# Patient Record
Sex: FEMALE | Race: WHITE | Employment: STUDENT | URBAN - METROPOLITAN AREA
[De-identification: names, ages, dates, MRNs, and addresses within clinical notes are randomized per-mention and may not be internally consistent; named-entity substitution may affect disease eponyms.]

---
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
## 2019-09-09 ENCOUNTER — NURSE TRIAGE (OUTPATIENT)
Dept: OTHER | Facility: CLINIC | Age: 19
End: 2019-09-09

## 2019-09-09 NOTE — TELEPHONE ENCOUNTER
Reason for Disposition   General information question, no triage required and triager able to answer question    Protocols used: INFORMATION ONLY CALL-ADULT-    Pt mother, Fatimah Perla, calling again. Unable to see PCP since it has been 4 years. Informed mom to try an evisit but unsure if she will be able to since she hasn't been seen. Otherwise, could go to a convenience clinic. Also, schedule a well check to re-establish care.

## 2020-02-13 NOTE — PLAN OF CARE
Daily Note    Today's date: 2020  Patient name: Kim Martinez  : 2011  MRN: [id number]  Referring provider: Suman Leggett MD  Dx:   Encounter Diagnosis     ICD-10-CM    1  Sprain of anterior talofibular ligament of left ankle, subsequent encounter S93 492D      Subjective: Diana Bernabe presents for PT today with her Mom  Diana Bernabe initially denied pain or concerns  She later pointed to her right plantar aspect of her foot/longitudinal arch and left inframalleolar generalized area as sources of pain with prolonged activity  No swelling or redness observed  Mom and Diana Bernabe deny any self restricted activities or decrease in running/jumping activities  Objective: See treatment diary below      Assessment: Tolerated treatment well  Despite Jeane's report of pain with palpation to various aspects of the plantar aspect of her feet and ankle structures this report is inconsistent with her activity tolerance and FLACC during high level balance activities  This is likely due to ongoing hypersensitivity as well as patients eagerness to continue with PT  Plan to discuss incorporating sensory/desensitization for the feet into Jeane's OT sessions  Regarding treatment for her ankle sprain patient continues with no swelling and improved stability to her baseline  Mom is reporting decreased incidents of rolling her ankles since starting with high top shoes  Plan: Continue per plan of care  1 additional visits prior to d/c  Precautions: standard  Objective      Goals  STGs (4 weeks)  1) Diana Carvalhor will demonstrate improved SLB bilaterally with eyes closed, exhibiting age appropriate ankle strategy for >6 seconds on 3/5 trials  Progress: 6 seconds bilaterally, EC   2) Diana Carvalhor will achieve >10 deg ankle DF PROM bilaterally (with knees extended) in order to demonstrate appropriate ankle DF PROM for ambulation  Progress: 10deg R, 5 deg Left     3) Diana Carvalhor will tolerate >30 minutes of dynamic activity without c/o left ankle impaired, limited or restricted    Pain Scale: 6/10  Easing factors: tucking arm in front of her  Provocative factors: arm out to side or in a 90-90 position has ant pain, R SL    Type: []Constant   [x]Intermittent  []Radiating []Localized []other:     Numbness/Tingling: none    Occupation/School: [employer]    Living Status/Prior Level of Function: Independent with ADLs and IADLs, bowl, soft ball    OBJECTIVE:     CERV ROM     Cervical Flexion     Cervical Extension     Cervical SB     Cervical rotation          ROM Left Right   Shoulder Flex    P/A 160/ 150/   Shoulder Abd     Shoulder ER      Pass/act  106/   Shoulder IR  Pass/act 73/ 37/                  Strength  Left Right   Shoulder Flex  5/5   Shoulder Scap  5/5   Shoulder ER  5/5   Shoulder IR  5/5               Reflexes/Sensation:    [x]Dermatomes/Myotomes intact    [x]Reflexes equal and normal bilaterally   []Other:    Joint mobility:    []Normal    []Hypo   []Hyper    Palpation: not tender    Functional Mobility/Transfers: independent     Posture: rounded shldrs    Bandages/Dressings/Incisions: nA    Gait: (include devices/WB status): WNL    Orthopedic Special Tests:                        [x] Patient history, allergies, meds reviewed. Medical chart reviewed. See intake form. Review Of Systems (ROS):  [x]Performed Review of systems (Integumentary, CardioPulmonary, Neurological) by intake and observation. Intake form has been scanned into medical record. Patient has been instructed to contact their primary care physician regarding ROS issues if not already being addressed at this time.       Co-morbidities/Complexities (which will affect course of rehabilitation):   [x]None           Arthritic conditions   []Rheumatoid arthritis (M05.9)  []Osteoarthritis (M19.91)   Cardiovascular conditions   []Hypertension (I10)  []Hyperlipidemia (E78.5)  []Angina pectoris (I20)  []Atherosclerosis (I70)   Musculoskeletal conditions   []Disc pathology   []Congenital pain or instability for 3 consecutive sessions  Progress: met previous session however pt with reports of generalized pain inconsistently this visit  LTGs (8 weeks)  1) Carey Barnes will tolerate dynamic activity (running >100 feet, DL jump 10 consecutive times, SL jump 5 consecutive times) without c/o pain or instability in order to demonstrate safe particpation in age appropriate activity  Progress: Pt demonstrating double leg jumping >10 consecutive times without instability  Treatment: [id number]  Manual  2/13/20   Bilateral ankle DF Prone: ankle DF, talocrural distraction, posterior glides, metatarsal sweeps: Bilateral LEs  Exercise Diary     Warm up Treadmill: 2 0mph, 0%-8% grade/incline: x10min  Left ankle isometric/reactions      balance Barefoot: step up/over folded up mat with dynamic change ("seesaw") x1, SLS on foam: x10sec on the L, x18 seconds on the R     1st toe extension    Towel scrunch    [place] pickup    Left ankle TB/EV    Self gastroc stretch    Other    Jumping:  Noted bilateral DL jump consecutively x5 and SL jump consecutively x5 without c/o pain and without incidence of ankle instability  spine pathologies   []Prior surgical intervention  []Osteoporosis (M81.8)  []Osteopenia (M85.8)   Endocrine conditions   []Hypothyroid (E03.9)  []Hyperthyroid Gastrointestinal conditions   []Constipation (B53.55)   Metabolic conditions   []Morbid obesity (E66.01)  []Diabetes type 1(E10.65) or 2 (E11.65)   []Neuropathy (G60.9)     Pulmonary conditions   []Asthma (J45)  []Coughing   []COPD (J44.9)   Psychological Disorders  []Anxiety (F41.9)  []Depression (F32.9)   []Other:   []Other:          Barriers to/and or personal factors that will affect rehab potential:              []Age  []Sex              []Motivation/Lack of Motivation                        []Co-Morbidities              []Cognitive Function, education/learning barriers              []Environmental, home barriers              []profession/work barriers  []past PT/medical experience  []other:  Justification:      Falls Risk Assessment (30 days): 0  [x] Falls Risk assessed and no intervention required. [] Falls Risk assessed and Patient requires intervention due to being higher risk   TUG score (>12s at risk):     [] Falls education provided, including       G-Codes:  PT G-Codes  Functional Assessment Tool Used: quick  Score: 45%  Functional Limitation: Carrying, moving and handling objects  Carrying, Moving and Handling Objects Current Status (): At least 40 percent but less than 60 percent impaired, limited or restricted  Carrying, Moving and Handling Objects Goal Status ():  At least 1 percent but less than 20 percent impaired, limited or restricted    ASSESSMENT:   Functional Impairments   []Noted spinal or UE joint hypomobility   []Noted spinal or UE joint hypermobility   [x]Decreased UE functional ROM   [x]Decreased UE functional strength   []Abnormal reflexes/sensation/myotomal/dermatomal deficits   [x]Decreased RC/scapular/core strength and neuromuscular control   []other:      Functional Activity Limitations (from functional questionnaire facilitate improvement in movement, function, and ADLs as indicated by Functional Deficits. Long Term Goals: To be achieved in: 8 weeks  1. Disability index score of 19% or less for the DASH to assist with reaching prior level of function. 2. Patient will demonstrate increased AROM to 150 to allow for proper joint functioning as indicated by patients Functional Deficits. 3. Patient will demonstrate an increase in Strength to 5/5 to allow for proper functional mobility as indicated by patients Functional Deficits. 4. Patient will return to full functional activities without increased symptoms or restriction.         Electronically signed by:  Laura aY PT

## 2020-10-05 ENCOUNTER — APPOINTMENT (OUTPATIENT)
Dept: ULTRASOUND IMAGING | Age: 20
DRG: 561 | End: 2020-10-05
Payer: COMMERCIAL

## 2020-10-05 ENCOUNTER — HOSPITAL ENCOUNTER (INPATIENT)
Age: 20
LOS: 2 days | Discharge: HOME | DRG: 561 | End: 2020-10-07
Attending: EMERGENCY MEDICINE | Admitting: INTERNAL MEDICINE
Payer: COMMERCIAL

## 2020-10-05 PROBLEM — K85.10 GALLSTONE PANCREATITIS: Status: ACTIVE | Noted: 2020-10-05

## 2020-10-05 LAB
ALBUMIN SERPL-MCNC: 4.7 G/DL (ref 3.4–5)
ALP BLD-CCNC: 119 U/L (ref 40–129)
ALT SERPL-CCNC: 487 U/L (ref 10–40)
ANION GAP SERPL CALCULATED.3IONS-SCNC: 11 MMOL/L (ref 3–16)
AST SERPL-CCNC: 852 U/L (ref 15–37)
BACTERIA: ABNORMAL /HPF
BASOPHILS ABSOLUTE: 0 K/UL (ref 0–0.2)
BASOPHILS RELATIVE PERCENT: 0.6 %
BILIRUB SERPL-MCNC: 1.5 MG/DL (ref 0–1)
BILIRUBIN DIRECT: 1 MG/DL (ref 0–0.3)
BILIRUBIN URINE: NEGATIVE
BILIRUBIN, INDIRECT: 0.5 MG/DL (ref 0–1)
BLOOD, URINE: ABNORMAL
BUN BLDV-MCNC: 10 MG/DL (ref 7–20)
CALCIUM SERPL-MCNC: 9.8 MG/DL (ref 8.3–10.6)
CHLORIDE BLD-SCNC: 104 MMOL/L (ref 99–110)
CLARITY: ABNORMAL
CO2: 24 MMOL/L (ref 21–32)
COLOR: YELLOW
CREAT SERPL-MCNC: 0.7 MG/DL (ref 0.6–1.1)
EOSINOPHILS ABSOLUTE: 0.1 K/UL (ref 0–0.6)
EOSINOPHILS RELATIVE PERCENT: 0.7 %
EPITHELIAL CELLS, UA: ABNORMAL /HPF (ref 0–5)
GFR AFRICAN AMERICAN: >60
GFR NON-AFRICAN AMERICAN: >60
GLUCOSE BLD-MCNC: 112 MG/DL (ref 70–99)
GLUCOSE URINE: NEGATIVE MG/DL
HCG QUALITATIVE: NEGATIVE
HCT VFR BLD CALC: 37.8 % (ref 36–48)
HEMOGLOBIN: 13 G/DL (ref 12–16)
KETONES, URINE: NEGATIVE MG/DL
LEUKOCYTE ESTERASE, URINE: ABNORMAL
LIPASE: 570 U/L (ref 13–60)
LYMPHOCYTES ABSOLUTE: 1.6 K/UL (ref 1–5.1)
LYMPHOCYTES RELATIVE PERCENT: 21 %
MCH RBC QN AUTO: 30.7 PG (ref 26–34)
MCHC RBC AUTO-ENTMCNC: 34.3 G/DL (ref 31–36)
MCV RBC AUTO: 89.6 FL (ref 80–100)
MICROSCOPIC EXAMINATION: YES
MONOCYTES ABSOLUTE: 0.8 K/UL (ref 0–1.3)
MONOCYTES RELATIVE PERCENT: 10.8 %
NEUTROPHILS ABSOLUTE: 5.2 K/UL (ref 1.7–7.7)
NEUTROPHILS RELATIVE PERCENT: 66.9 %
NITRITE, URINE: NEGATIVE
PDW BLD-RTO: 13.6 % (ref 12.4–15.4)
PH UA: 8 (ref 5–8)
PLATELET # BLD: 385 K/UL (ref 135–450)
PMV BLD AUTO: 7.5 FL (ref 5–10.5)
POTASSIUM REFLEX MAGNESIUM: 4.1 MMOL/L (ref 3.5–5.1)
PROTEIN UA: NEGATIVE MG/DL
RBC # BLD: 4.22 M/UL (ref 4–5.2)
RBC UA: ABNORMAL /HPF (ref 0–4)
SODIUM BLD-SCNC: 139 MMOL/L (ref 136–145)
SPECIFIC GRAVITY UA: 1.01 (ref 1–1.03)
TOTAL PROTEIN: 8.2 G/DL (ref 6.4–8.2)
URINE REFLEX TO CULTURE: YES
URINE TYPE: ABNORMAL
UROBILINOGEN, URINE: 0.2 E.U./DL
WBC # BLD: 7.7 K/UL (ref 4–11)
WBC UA: ABNORMAL /HPF (ref 0–5)

## 2020-10-05 PROCEDURE — 85025 COMPLETE CBC W/AUTO DIFF WBC: CPT

## 2020-10-05 PROCEDURE — 76705 ECHO EXAM OF ABDOMEN: CPT

## 2020-10-05 PROCEDURE — 84703 CHORIONIC GONADOTROPIN ASSAY: CPT

## 2020-10-05 PROCEDURE — G0378 HOSPITAL OBSERVATION PER HR: HCPCS

## 2020-10-05 PROCEDURE — 96372 THER/PROPH/DIAG INJ SC/IM: CPT

## 2020-10-05 PROCEDURE — 87077 CULTURE AEROBIC IDENTIFY: CPT

## 2020-10-05 PROCEDURE — 80074 ACUTE HEPATITIS PANEL: CPT

## 2020-10-05 PROCEDURE — 87086 URINE CULTURE/COLONY COUNT: CPT

## 2020-10-05 PROCEDURE — 80076 HEPATIC FUNCTION PANEL: CPT

## 2020-10-05 PROCEDURE — 6370000000 HC RX 637 (ALT 250 FOR IP): Performed by: INTERNAL MEDICINE

## 2020-10-05 PROCEDURE — 2580000003 HC RX 258: Performed by: EMERGENCY MEDICINE

## 2020-10-05 PROCEDURE — 1200000000 HC SEMI PRIVATE

## 2020-10-05 PROCEDURE — 99285 EMERGENCY DEPT VISIT HI MDM: CPT

## 2020-10-05 PROCEDURE — 36415 COLL VENOUS BLD VENIPUNCTURE: CPT

## 2020-10-05 PROCEDURE — 6360000002 HC RX W HCPCS: Performed by: EMERGENCY MEDICINE

## 2020-10-05 PROCEDURE — 96367 TX/PROPH/DG ADDL SEQ IV INF: CPT

## 2020-10-05 PROCEDURE — 96361 HYDRATE IV INFUSION ADD-ON: CPT

## 2020-10-05 PROCEDURE — 83690 ASSAY OF LIPASE: CPT

## 2020-10-05 PROCEDURE — 6360000002 HC RX W HCPCS: Performed by: INTERNAL MEDICINE

## 2020-10-05 PROCEDURE — 2580000003 HC RX 258: Performed by: INTERNAL MEDICINE

## 2020-10-05 PROCEDURE — 6370000000 HC RX 637 (ALT 250 FOR IP): Performed by: NURSE PRACTITIONER

## 2020-10-05 PROCEDURE — 96365 THER/PROPH/DIAG IV INF INIT: CPT

## 2020-10-05 PROCEDURE — 99254 IP/OBS CNSLTJ NEW/EST MOD 60: CPT | Performed by: INTERNAL MEDICINE

## 2020-10-05 PROCEDURE — 80048 BASIC METABOLIC PNL TOTAL CA: CPT

## 2020-10-05 PROCEDURE — 81001 URINALYSIS AUTO W/SCOPE: CPT

## 2020-10-05 RX ORDER — POLYETHYLENE GLYCOL 3350 17 G/17G
17 POWDER, FOR SOLUTION ORAL DAILY PRN
Status: DISCONTINUED | OUTPATIENT
Start: 2020-10-05 | End: 2020-10-07 | Stop reason: HOSPADM

## 2020-10-05 RX ORDER — ONDANSETRON 2 MG/ML
4 INJECTION INTRAMUSCULAR; INTRAVENOUS EVERY 6 HOURS PRN
Status: DISCONTINUED | OUTPATIENT
Start: 2020-10-05 | End: 2020-10-07 | Stop reason: HOSPADM

## 2020-10-05 RX ORDER — PRENATAL WITH FERROUS FUM AND FOLIC ACID 3080; 920; 120; 400; 22; 1.84; 3; 20; 10; 1; 12; 200; 27; 25; 2 [IU]/1; [IU]/1; MG/1; [IU]/1; MG/1; MG/1; MG/1; MG/1; MG/1; MG/1; UG/1; MG/1; MG/1; MG/1; MG/1
1 TABLET ORAL DAILY
Status: DISCONTINUED | OUTPATIENT
Start: 2020-10-05 | End: 2020-10-07 | Stop reason: HOSPADM

## 2020-10-05 RX ORDER — SODIUM CHLORIDE 9 MG/ML
INJECTION, SOLUTION INTRAVENOUS CONTINUOUS
Status: DISCONTINUED | OUTPATIENT
Start: 2020-10-05 | End: 2020-10-06

## 2020-10-05 RX ORDER — SODIUM CHLORIDE 0.9 % (FLUSH) 0.9 %
10 SYRINGE (ML) INJECTION EVERY 12 HOURS SCHEDULED
Status: DISCONTINUED | OUTPATIENT
Start: 2020-10-05 | End: 2020-10-07 | Stop reason: HOSPADM

## 2020-10-05 RX ORDER — 0.9 % SODIUM CHLORIDE 0.9 %
1000 INTRAVENOUS SOLUTION INTRAVENOUS ONCE
Status: COMPLETED | OUTPATIENT
Start: 2020-10-05 | End: 2020-10-05

## 2020-10-05 RX ORDER — IBUPROFEN 400 MG/1
400 TABLET ORAL EVERY 6 HOURS PRN
Status: DISCONTINUED | OUTPATIENT
Start: 2020-10-05 | End: 2020-10-07 | Stop reason: HOSPADM

## 2020-10-05 RX ORDER — CHOLECALCIFEROL (VITAMIN D3) 125 MCG
5 CAPSULE ORAL NIGHTLY PRN
Status: DISCONTINUED | OUTPATIENT
Start: 2020-10-05 | End: 2020-10-07 | Stop reason: HOSPADM

## 2020-10-05 RX ORDER — SODIUM CHLORIDE 0.9 % (FLUSH) 0.9 %
10 SYRINGE (ML) INJECTION PRN
Status: DISCONTINUED | OUTPATIENT
Start: 2020-10-05 | End: 2020-10-07 | Stop reason: HOSPADM

## 2020-10-05 RX ADMIN — SODIUM CHLORIDE 1000 ML: 9 INJECTION, SOLUTION INTRAVENOUS at 12:00

## 2020-10-05 RX ADMIN — PIPERACILLIN AND TAZOBACTAM 3.38 G: 3; .375 INJECTION, POWDER, LYOPHILIZED, FOR SOLUTION INTRAVENOUS at 11:59

## 2020-10-05 RX ADMIN — MELATONIN TAB 5 MG 5 MG: 5 TAB at 22:07

## 2020-10-05 RX ADMIN — IBUPROFEN 400 MG: 400 TABLET, FILM COATED ORAL at 15:13

## 2020-10-05 RX ADMIN — Medication 10 ML: at 18:07

## 2020-10-05 RX ADMIN — SODIUM CHLORIDE: 9 INJECTION, SOLUTION INTRAVENOUS at 18:00

## 2020-10-05 RX ADMIN — ENOXAPARIN SODIUM 40 MG: 40 INJECTION SUBCUTANEOUS at 18:13

## 2020-10-05 RX ADMIN — SODIUM CHLORIDE 1.5 G: 900 INJECTION INTRAVENOUS at 18:02

## 2020-10-05 ASSESSMENT — PAIN SCALES - GENERAL
PAINLEVEL_OUTOF10: 4
PAINLEVEL_OUTOF10: 0

## 2020-10-05 NOTE — ED NOTES
Pt requesting something for pain request Ibuprofen. Dr Star Bustamante made aware.      Carol Ross, GLORIAN  02/48/17 4765

## 2020-10-05 NOTE — ED NOTES
Called [facility] @ [phone]  Re: LFT/lipase bumped, gallstones  Dr Anjum Zhao responded @ [facility]  10/05/20 [phone]

## 2020-10-05 NOTE — ED NOTES
Pt states \"last meal was yesterday last drink sips of water today\".       Derek Cruz, GLORIAN  03/79/94 [place]. Tiago Pires, JOANA  75/38/45 0231

## 2020-10-05 NOTE — ED PROVIDER NOTES
[facility] Emergency Department    CHIEF COMPLAINT  Chief Complaint   Patient presents with    Abdominal Pain     patient c/o abdominal pain that started around 0230 and ended around 0700. pt reporting \"i have gallbladder issues\"         HISTORY OF PRESENT ILLNESS  Adriana Jacob is a 20 y.o. female  who presents to the ED complaining of intermittent RUQ abdominal pain. She is roughly a month postpartum and currently breastfeeding. RUQ pain when it occurs is typically at night, not specifically postprandial though, and not associated with fever. Last night/this morning was the worst though at time of ED presentation she is wholly asymptomatic, no nausea or pain at all. No jaundice at any point. No back pains or chest pains. The RUQ pain sometimes radiates to the back though and is crampy when it occurs. During her pregnancy she was told she has GB sludge and did not get surgery at that time due to the pregnancy. She has had 3 of these pain episodes this week which are making it hard for her to take care of her baby when it happens. No fevers. No pale or loose or black/bloody stools, no dysuria or hematuria or other urinary complaints. No other complaints, modifying factors or associated symptoms. I have reviewed the following from the nursing documentation.     Past Medical History:   Diagnosis Date    Asthma     Stress reaction, metatarsal      Past Surgical History:   Procedure Laterality Date    TYMPANOSTOMY TUBE PLACEMENT  2000     Family History   Problem Relation Age of Onset    High Blood Pressure Mother     Cancer Maternal Grandmother 30        Ovarian cancer     Social History     Socioeconomic History    Marital status: Single     Spouse name: Not on file    Number of children: Not on file    Years of education: Not on file    Highest education level: Not on file   Occupational History    Not on file   Social Needs    Financial resource strain: Not on file    Food insecurity     Worry: Not on file     Inability: Not on file    Transportation needs     Medical: Not on file     Non-medical: Not on file   Tobacco Use    Smoking status: Never Smoker    Smokeless tobacco: Never Used   Substance and Sexual Activity    Alcohol use: No    Drug use: Not on file    Sexual activity: Not on file   Lifestyle    Physical activity     Days per week: Not on file     Minutes per session: Not on file    Stress: Not on file   Relationships    Social connections     Talks on phone: Not on file     Gets together: Not on file     Attends Zoroastrian service: Not on file     Active member of club or organization: Not on file     Attends meetings of clubs or organizations: Not on file     Relationship status: Not on file    Intimate partner violence     Fear of current or ex partner: Not on file     Emotionally abused: Not on file     Physically abused: Not on file     Forced sexual activity: Not on file   Other Topics Concern    Not on file   Social History Narrative    Not on file     No current facility-administered medications for this encounter. Current Outpatient Medications   Medication Sig Dispense Refill    Prenatal Vit-Fe Fumarate-FA (PRENATAL 1+1 PO) Take by mouth       Allergies   Allergen Reactions    Sulfa Antibiotics Hives       REVIEW OF SYSTEMS  10 systems reviewed, pertinent positives per HPI otherwise noted to be negative. PHYSICAL EXAM  BP (!) 131/91   Pulse 84   Temp 98.7 °F (37.1 °C) (Oral)   Resp 17   Ht 5' 10\" (1.778 m)   Wt 200 lb (90.7 kg)   SpO2 99%   BMI 28.70 kg/m²    GENERAL APPEARANCE: Awake and alert. Cooperative. No distress. HENT: Normocephalic. Atraumatic. Mucous membranes are moist.  NECK: Supple. EYES: PERRL. EOM's grossly intact. HEART/CHEST: RRR. No murmurs. No chest wall tenderness. LUNGS: Respirations unlabored. CTAB. Good air exchange. Speaking comfortably in full sentences. ABDOMEN: No tenderness. Soft. Non-distended. No masses. No organomegaly. No guarding or rebound. Normal bowel sounds throughout. Neg Brown's sign. MUSCULOSKELETAL: No extremity edema. Compartments soft. No deformity. No tenderness in the extremities. All extremities neurovascularly intact. SKIN: Warm and dry. No acute rashes. NEUROLOGICAL: Alert and oriented. CN's 2-12 intact. No gross facial drooping. Strength 5/5, sensation intact. 2 plus DTR's in knees bilaterally. Gait normal.  PSYCHIATRIC: Normal mood and affect. LABS  I have reviewed all labs for this visit.    Results for orders placed or performed during the hospital encounter of 10/05/20   Urinalysis Reflex to Culture    Specimen: Urine, clean catch   Result Value Ref Range    Color, UA Yellow Straw/Yellow    Clarity, UA SL CLOUDY (A) Clear    Glucose, Ur Negative Negative mg/dL    Bilirubin Urine Negative Negative    Ketones, Urine Negative Negative mg/dL    Specific Gravity, UA 1.015 1.005 - 1.030    Blood, Urine MODERATE (A) Negative    pH, UA 8.0 5.0 - 8.0    Protein, UA Negative Negative mg/dL    Urobilinogen, Urine 0.2 <2.0 E.U./dL    Nitrite, Urine Negative Negative    Leukocyte Esterase, Urine MODERATE (A) Negative    Microscopic Examination YES     Urine Type NotGiven     Urine Reflex to Culture Yes    CBC Auto Differential   Result Value Ref Range    WBC 7.7 4.0 - 11.0 K/uL    RBC 4.22 4.00 - 5.20 M/uL    Hemoglobin 13.0 12.0 - 16.0 g/dL    Hematocrit 37.8 36.0 - 48.0 %    MCV 89.6 80.0 - 100.0 fL    MCH 30.7 26.0 - 34.0 pg    MCHC 34.3 31.0 - 36.0 g/dL    RDW 13.6 12.4 - 15.4 %    Platelets 655 994 - 216 K/uL    MPV 7.5 5.0 - 10.5 fL    Neutrophils % 66.9 %    Lymphocytes % 21.0 %    Monocytes % 10.8 %    Eosinophils % 0.7 %    Basophils % 0.6 %    Neutrophils Absolute 5.2 1.7 - 7.7 K/uL    Lymphocytes Absolute 1.6 1.0 - 5.1 K/uL    Monocytes Absolute 0.8 0.0 - 1.3 K/uL    Eosinophils Absolute 0.1 0.0 - 0.6 K/uL    Basophils Absolute 0.0 0.0 - 0.2 K/uL   Basic Metabolic Panel w/ Reflex to MG   Result Value Ref Range    Sodium 139 136 - 145 mmol/L    Potassium reflex Magnesium 4.1 3.5 - 5.1 mmol/L    Chloride 104 99 - 110 mmol/L    CO2 24 21 - 32 mmol/L    Anion Gap 11 3 - 16    Glucose 112 (H) 70 - 99 mg/dL    BUN 10 7 - 20 mg/dL    CREATININE 0.7 0.6 - 1.1 mg/dL    GFR Non-African American >60 >60    GFR African American >60 >60    Calcium 9.8 8.3 - 10.6 mg/dL   Hepatic function panel   Result Value Ref Range    Total Protein 8.2 6.4 - 8.2 g/dL    Alb 4.7 3.4 - 5.0 g/dL    Alkaline Phosphatase 119 40 - 129 U/L     (H) 10 - 40 U/L     (H) 15 - 37 U/L    Total Bilirubin 1.5 (H) 0.0 - 1.0 mg/dL    Bilirubin, Direct 1.0 (H) 0.0 - 0.3 mg/dL    Bilirubin, Indirect 0.5 0.0 - 1.0 mg/dL   Lipase   Result Value Ref Range    Lipase 570.0 (H) 13.0 - 60.0 U/L   HCG Qualitative, Serum   Result Value Ref Range    hCG Qual Negative Detects HCG level >10 MIU/mL   Microscopic Urinalysis   Result Value Ref Range    WBC, UA 10-20 (A) 0 - 5 /HPF    RBC, UA 21-50 (A) 0 - 4 /HPF    Epithelial Cells, UA 6-10 (A) 0 - 5 /HPF    Bacteria, UA 1+ (A) None Seen /HPF       RADIOLOGY    Us Gallbladder Ruq    Result Date: 10/5/2020  EXAMINATION: RIGHT UPPER QUADRANT ULTRASOUND 10/5/2020 9:44 am COMPARISON: None. HISTORY: ORDERING SYSTEM PROVIDED HISTORY: biliary colic sx TECHNOLOGIST PROVIDED HISTORY: Reason for exam:->biliary colic sx FINDINGS: LIVER:  The liver demonstrates normal echogenicity without evidence of intrahepatic biliary ductal dilatation. BILIARY SYSTEM:  The gallbladder contains multiple shadowing echogenic gallstones. There is mild wall thickening of 6 mm. Common bile duct is at the upper limits of normal, measuring 7 mm. RIGHT KIDNEY: The right kidney is grossly unremarkable without evidence of hydronephrosis. PANCREAS:  Visualized portions of the pancreas are unremarkable. OTHER: No evidence of right upper quadrant ascites.      1. Cholelithiasis with sonographic evidence of cholecystitis. 2. Borderline common duct dilatation. Laboratory correlation requested. MRCP and/or ERCP would be options to further evaluate in the appropriate clinical setting. ED COURSE/MDM  Patient seen and evaluated. Old records reviewed. Labs and imaging reviewed and results discussed with patient. After initial evaluation, differential diagnostic considerations included: kidney stone, pyelonephritis, UTI, appendicitis, bowel obstruction, diverticulitis, hernia, gastritis/gastroenteritis, pancreatitis, cholecystitis, hepatitis, constipation, IBS, IBD    The patient's ED workup was notable for concern for biliary colic with known gallstones. Found to have lipase elevation, bilirubin elevation, transaminitis despite now being pain-free and overall feeling better. Kept NPO in ED. Given notable lab abnormalities, concern for choledocholithiasis though stone may have passed. RUQ u/s shows cholecystitis findings as well. Dr. Vic Masters from surgery was consulted about the patient's ED history, physical, workup, and course so far. Recommendations from this consultant included follow labs tomorrow, serial abd exam, to determine timing of surgery (inpt vs outpt). OK with Zosyn for now. Requests hospitalist admit and GI consult. GI Dr. Kenyatta Harman was consulted about the patient's ED history, physical, workup, and course so far. Recommendations from this consultant included agreement with plan as above for now, they will follow as well. During the patient's ED course, the patient was given:  Medications   piperacillin-tazobactam (ZOSYN) 3.375 g in dextrose 5 % 50 mL IVPB (mini-bag) (3.375 g Intravenous New Bag 10/5/20 1159)   0.9 % sodium chloride IV bolus 1,000 mL (1,000 mLs Intravenous New Bag 10/5/20 1200)        CLINICAL IMPRESSION  1. Acute cholecystitis due to biliary calculus    2. Acute gallstone pancreatitis    3. Biliary obstruction    4. Transaminitis    5.  Postpartum

## 2020-10-05 NOTE — H&P
Hospital Medicine History & Physical      PCP: Julio Falcon DO    Date of Admission: 10/5/2020    Date of Service: Pt seen/examined on 10/05/20 and Admitted to Inpatient with expected LOS greater than two midnights due to medical therapy. Chief Complaint:  RUQ pain, n/v    History Of Present Illness: The patient is a pleasant 20 Y F with minimal PMH other than giving birth to a healthy baby boy about a month ago. She is breastfeeding. During her pregnancy she was treated conservatively for biliary colic, with sludge on her GB ultrasound. For the last week she has had intermittent spells of severe RUQ pain, radiating to her R scapular area, associated with nausea and vomiting. Another such spell occurred around 2:30 this AM and lasted until 7 AM, so she came to the ED to be evaluated. US showed calculous cholecystitis with a dilated CBD, and labs showed hepatitis and pancreatitis. She was not febrile and did not have a leukocytosis. GI, general surgery, and hospital medicine were consulted from the ED. The patient actually feels OK now. The pain has been minimal since early this AM.  She doesn't even have any RUQ tenderness on exam.        Past Medical History:          Diagnosis Date    Asthma     Stress reaction, metatarsal        Past Surgical History:          Procedure Laterality Date    TYMPANOSTOMY TUBE PLACEMENT  2000       Medications Prior to Admission:      Prior to Admission medications    Medication Sig Start Date End Date Taking? Authorizing Provider   Prenatal Vit-Fe Fumarate-FA (PRENATAL 1+1 PO) Take by mouth   Yes Historical Provider, MD       Allergies:  Sulfa antibiotics    Social History:      The patient currently lives at home    TOBACCO:   reports that she has never smoked. She has never used smokeless tobacco.  ETOH:   reports no history of alcohol use.   E-Cigarettes Vaping or Juuling     Questions Responses    Vaping Use     Start Date     Does device contain nicotine? Quit Date     Vaping Type             Family History:      Reviewed in detail and negative for ESRD. Positive as follows:        Problem Relation Age of Onset    High Blood Pressure Mother     Cancer Maternal Grandmother 30        Ovarian cancer       REVIEW OF SYSTEMS:   Pertinent positives as noted in the HPI. All other systems reviewed and negative. PHYSICAL EXAM PERFORMED:    BP (!) 131/91   Pulse 84   Temp 98.7 °F (37.1 °C) (Oral)   Resp 17   Ht 5' 10\" (1.778 m)   Wt 200 lb (90.7 kg)   SpO2 99%   BMI 28.70 kg/m²     General appearance:  No apparent distress, appears stated age and cooperative. HEENT:  Normal cephalic, atraumatic without obvious deformity. Pupils equal, round, and reactive to light. Extra ocular muscles intact. Conjunctivae/corneas clear. Neck: Supple, with full range of motion. No jugular venous distention. Trachea midline. Respiratory:  Normal respiratory effort. Clear to auscultation, bilaterally without Rales/Wheezes/Rhonchi. Cardiovascular:  Regular rate and rhythm with normal S1/S2 without murmurs, rubs or gallops. Abdomen: Soft, non-tender, non-distended with normal bowel sounds. No R flank tenderness either. Musculoskeletal:  No clubbing, cyanosis or edema bilaterally. Full range of motion without deformity. Skin: Skin color, texture, turgor normal.  No rashes or lesions. Neurologic:  Neurovascularly intact without any focal sensory/motor deficits. Cranial nerves: II-XII intact, grossly non-focal.  Psychiatric:  Alert and oriented, thought content appropriate, normal insight. Not particularly anxious.    Capillary Refill: Brisk,< 3 seconds   Peripheral Pulses: +2 palpable, equal bilaterally       Labs:     Recent Labs     10/05/20  0939   WBC 7.7   HGB 13.0   HCT 37.8        Recent Labs     10/05/20  0939      K 4.1      CO2 24   BUN 10   CREATININE 0.7   CALCIUM 9.8     Recent Labs     10/05/20  0939   *   *   BILIDIR 1.0*   BILITOT 1.5*   ALKPHOS 119     No results for input(s): INR in the last 72 hours. No results for input(s): Cesar Hug in the last 72 hours. Urinalysis:      Lab Results   Component Value Date    NITRU Negative 10/05/2020    WBCUA 10-20 10/05/2020    BACTERIA 1+ 10/05/2020    RBCUA 21-50 10/05/2020    BLOODU MODERATE 10/05/2020    SPECGRAV 1.015 10/05/2020    GLUCOSEU Negative 10/05/2020       Radiology:     CXR: I have reviewed the CXR with the following interpretation: none  EKG:  I have reviewed the EKG with the following interpretation: none    US GALLBLADDER RUQ   Final Result   1. Cholelithiasis with sonographic evidence of cholecystitis. 2. Borderline common duct dilatation. Laboratory correlation requested. MRCP and/or ERCP would be options to further evaluate in the appropriate   clinical setting. ASSESSMENT:    Active Hospital Problems    Diagnosis Date Noted    Gallstone pancreatitis [K85.10] 10/05/2020         PLAN:    The patient is a pleasant 20 Y F with minimal PMH other than giving birth to a healthy baby boy about a month ago. She is breastfeeding. During her pregnancy she was treated conservatively for biliary colic, with sludge on her GB ultrasound. For the last week she has had intermittent spells of severe RUQ pain, radiating to her R scapular area, associated with nausea and vomiting. Another such spell occurred around 2:30 this AM and lasted until 7 AM, so she came to the ED to be evaluated. US showed calculous cholecystitis with a dilated CBD, and labs showed hepatitis and pancreatitis. She was not febrile and did not have a leukocytosis. GI, general surgery, and hospital medicine were consulted from the ED.       Calculous cholecystitis, complicated by suspected choledocholithiasis, hepatitis, and gallstone pancreatitis  - If surgery is not planning for cholecystectomy with IOC during this admission, then will defer to GI as to whether MRCP would be helpful now prior to eventual cholecystectomy. - of note, her LFT elevation is not clearly in a cholestatic pattern. Perhaps she already passed the stone. Trend labs. - zosyn in the ED, continued with unasyn on admission, will plan for augmentin upon discharge. Avoid both cipro and metronidazole since she is breastfeeding.  - IVFs, antiemetics  - analgesia. Avoiding acetaminophen due to the LFTs. Ibuprofen would be OK. Avoiding opioids if possible since she is breastfeeding - discussed risks in detail with patient and her mother, and they understand that if the patient is really miserable then the risk may be worth it, they agreed. - this patient would be low risk for cholecystectomy, there are no contraindications, and no further preoperative testing would be necessary. Possible acute cystitis  - abx as above, f/u urine culture    Asthma  - she apparently isn't on any medications. Monitor for wheezing. DVT Prophylaxis: enoxaparin  Diet: Diet NPO Effective Now Exceptions are: Ice Chips  Code Status: No Order    PT/OT Eval Status: not indicated    Dispo - perhaps 10/6-7, pending tolerating of diet and GI and surgery input. She lives at home. Annamarie Mcleod MD    Thank you Avila Ortiz DO for the opportunity to be involved in this patient's care. If you have any questions or concerns please feel free to contact me at [phone].

## 2020-10-05 NOTE — PROGRESS NOTES
Bedside report received from [facility] in ER. Pt transported in stable condition via bed to  344. VSS. Denies N/V; BS hypoactive; passing flatus. Denies dyspnea. Reports tolerable headache; currently negative for abd pain. Oriented to room, tv/bed/call light functions. Call light within reach. Bed side table within reach. Wheels locked. Bed in lowest position. Independent with ADLs. Pt instructed to call out for assistance. Pt expressed understanding & calls out appropriately. Admission charting began. All care cont per orders. Will cont to monitor.  Electronically signed by Berlinda Cushing, RN on 10/5/2020 at 7:05 PM

## 2020-10-05 NOTE — CONSULTS
[facility] PHYSICIANS        HPI:  20 y.o. female with PMH as stated below admitted to [facility] for abdominal pain. Has been having intermittent right UQ pain that lasted about 4 hours and was intense and associated with nausea and emesis multiple times before it resolved. She is 1 month post partum, breastfeeding. She came in with right UQ pain. Currently says pain has resolved and is not having any nausea or emesis. She has had issues with intermittent right UQ pain going back to around May but episodes are now becoming more frequent and intense. Her labs in ED show significant AST, ALT elevations, total bilirubin 1.5, lipase 570. No prior history of liver disease. No drug use. US shows gallstones, borderline common bile duct dilation 7 mm, gallbladder has multiple gallstones and mild wall thickening of 6 mm.   She is hemodynamically stable and afebrile    PAST MEDICAL HISTORY     Past Medical History:   Diagnosis Date    Asthma     Stress reaction, metatarsal      FAMILY HISTORY     Family History   Problem Relation Age of Onset    High Blood Pressure Mother     Cancer Maternal Grandmother 27        Ovarian cancer     SOCIAL HISTORY     Social History     Socioeconomic History    Marital status: Single     Spouse name: Not on file    Number of children: Not on file    Years of education: Not on file    Highest education level: Not on file   Occupational History    Not on file   Social Needs    Financial resource strain: Not on file    Food insecurity     Worry: Not on file     Inability: Not on file    Transportation needs     Medical: Not on file     Non-medical: Not on file   Tobacco Use    Smoking status: Never Smoker    Smokeless tobacco: Never Used   Substance and Sexual Activity    Alcohol use: No    Drug use: Not on file    Sexual activity: Not on file   Lifestyle    Physical activity     Days per week: Not on file     Minutes per session: Not on file    Stress: Not on file Relationships    Social connections     Talks on phone: Not on file     Gets together: Not on file     Attends [employer] service: Not on file     Active member of club or organization: Not on file     Attends meetings of clubs or organizations: Not on file     Relationship status: Not on file    Intimate partner violence     Fear of current or ex partner: Not on file     Emotionally abused: Not on file     Physically abused: Not on file     Forced sexual activity: Not on file   Other Topics Concern    Not on file   Social History Narrative    Not on file     SURGICAL HISTORY     Past Surgical History:   Procedure Laterality Date    TYMP[address]Y [address]   (This list may include medications prescribed during this encounter as epic can not insert only the list prior to this encounter.)  Current Outpatient Rx   Medication Sig Dispense Refill    Prenatal Vit-Fe Fumarate-FA (PRENATAL 1+1 PO) Take by mouth        ALLERGIES     Allergies   Allergen Reactions    Sulfa Antibiotics Hives       REVIEW OF SYSTEMS   12 point ROS done and negative except as mentioned in the HPI. PHYSICAL EXAM   BP (!) 131/91   Pulse 84   Temp 98.7 °F (37.1 °C) (Oral)   Resp 17   Ht 5' 10\" (1.778 m)   Wt 200 lb (90.7 kg)   SpO2 99%   BMI 28.70 kg/m²   Wt Readings from Last 3 Encounters:   10/05/20 200 lb (90.7 kg)   08/15/18 190 lb (86.2 kg) (96 %, Z= 1.81)*   08/10/18 190 lb (86.2 kg) (96 %, Z= 1.81)*     * Growth percentiles are based on CDC (Girls, 2-20 Years) data. Constitutional: AAO3, No acute distress  HENT: no pallor or icterus. Eyes: Conjunctiva normal, No discharge. Cardiovascular: Normal heart rate, Normal rhythm, No murmurs,. Thorax & Lungs: Normal breath sounds, No wheezing,   Abdomen: soft, non tender, not distended, BS+. No hepatosplenomegaly.   Extremities:  No edema    Recent Labs     10/05/20  0939   WBC 7.7   HGB 13.0   MCV 89.6         K 4.1

## 2020-10-05 NOTE — ED NOTES
[facility] @ 2760  Re:  GI/hosp/surgery re: gallstone pancreatitis  Dr Josue Bender responded @ [address] Rosina Chairez  10/05/20 7800

## 2020-10-06 ENCOUNTER — APPOINTMENT (OUTPATIENT)
Dept: MRI IMAGING | Age: 20
DRG: 561 | End: 2020-10-06
Payer: COMMERCIAL

## 2020-10-06 ENCOUNTER — APPOINTMENT (OUTPATIENT)
Dept: ULTRASOUND IMAGING | Age: 20
DRG: 561 | End: 2020-10-06
Payer: COMMERCIAL

## 2020-10-06 LAB
ALBUMIN SERPL-MCNC: 3.7 G/DL (ref 3.4–5)
ALP BLD-CCNC: 119 U/L (ref 40–129)
ALT SERPL-CCNC: 837 U/L (ref 10–40)
ANION GAP SERPL CALCULATED.3IONS-SCNC: 11 MMOL/L (ref 3–16)
AST SERPL-CCNC: 709 U/L (ref 15–37)
BASOPHILS ABSOLUTE: 0.1 K/UL (ref 0–0.2)
BASOPHILS RELATIVE PERCENT: 1.1 %
BILIRUB SERPL-MCNC: 2.2 MG/DL (ref 0–1)
BILIRUBIN DIRECT: 1.1 MG/DL (ref 0–0.3)
BILIRUBIN, INDIRECT: 1.1 MG/DL (ref 0–1)
BUN BLDV-MCNC: 8 MG/DL (ref 7–20)
CALCIUM SERPL-MCNC: 8.6 MG/DL (ref 8.3–10.6)
CHLORIDE BLD-SCNC: 108 MMOL/L (ref 99–110)
CO2: 22 MMOL/L (ref 21–32)
CREAT SERPL-MCNC: 0.7 MG/DL (ref 0.6–1.1)
EOSINOPHILS ABSOLUTE: 0.2 K/UL (ref 0–0.6)
EOSINOPHILS RELATIVE PERCENT: 4.3 %
GFR AFRICAN AMERICAN: >60
GFR NON-AFRICAN AMERICAN: >60
GLUCOSE BLD-MCNC: 89 MG/DL (ref 70–99)
HAV IGM SER IA-ACNC: NORMAL
HCT VFR BLD CALC: 34.7 % (ref 36–48)
HEMOGLOBIN: 11.7 G/DL (ref 12–16)
HEPATITIS B CORE IGM ANTIBODY: NORMAL
HEPATITIS B SURFACE ANTIGEN INTERPRETATION: NORMAL
HEPATITIS C ANTIBODY INTERPRETATION: NORMAL
LIPASE: 55 U/L (ref 13–60)
LYMPHOCYTES ABSOLUTE: 1.9 K/UL (ref 1–5.1)
LYMPHOCYTES RELATIVE PERCENT: 34.4 %
MCH RBC QN AUTO: 30.2 PG (ref 26–34)
MCHC RBC AUTO-ENTMCNC: 33.6 G/DL (ref 31–36)
MCV RBC AUTO: 89.9 FL (ref 80–100)
MONOCYTES ABSOLUTE: 0.5 K/UL (ref 0–1.3)
MONOCYTES RELATIVE PERCENT: 9.9 %
NEUTROPHILS ABSOLUTE: 2.8 K/UL (ref 1.7–7.7)
NEUTROPHILS RELATIVE PERCENT: 50.3 %
ORGANISM: ABNORMAL
PDW BLD-RTO: 13.6 % (ref 12.4–15.4)
PLATELET # BLD: 288 K/UL (ref 135–450)
PMV BLD AUTO: 8 FL (ref 5–10.5)
POTASSIUM REFLEX MAGNESIUM: 4 MMOL/L (ref 3.5–5.1)
RBC # BLD: 3.86 M/UL (ref 4–5.2)
SODIUM BLD-SCNC: 141 MMOL/L (ref 136–145)
TOTAL PROTEIN: 6.5 G/DL (ref 6.4–8.2)
URINE CULTURE, ROUTINE: ABNORMAL
WBC # BLD: 5.5 K/UL (ref 4–11)

## 2020-10-06 PROCEDURE — G0378 HOSPITAL OBSERVATION PER HR: HCPCS

## 2020-10-06 PROCEDURE — 96366 THER/PROPH/DIAG IV INF ADDON: CPT

## 2020-10-06 PROCEDURE — 6370000000 HC RX 637 (ALT 250 FOR IP): Performed by: INTERNAL MEDICINE

## 2020-10-06 PROCEDURE — 83516 IMMUNOASSAY NONANTIBODY: CPT

## 2020-10-06 PROCEDURE — 96361 HYDRATE IV INFUSION ADD-ON: CPT

## 2020-10-06 PROCEDURE — 80076 HEPATIC FUNCTION PANEL: CPT

## 2020-10-06 PROCEDURE — 2580000003 HC RX 258: Performed by: INTERNAL MEDICINE

## 2020-10-06 PROCEDURE — 93975 VASCULAR STUDY: CPT

## 2020-10-06 PROCEDURE — 99254 IP/OBS CNSLTJ NEW/EST MOD 60: CPT | Performed by: SURGERY

## 2020-10-06 PROCEDURE — 74181 MRI ABDOMEN W/O CONTRAST: CPT

## 2020-10-06 PROCEDURE — 99233 SBSQ HOSP IP/OBS HIGH 50: CPT | Performed by: INTERNAL MEDICINE

## 2020-10-06 PROCEDURE — 6360000002 HC RX W HCPCS: Performed by: INTERNAL MEDICINE

## 2020-10-06 PROCEDURE — 85025 COMPLETE CBC W/AUTO DIFF WBC: CPT

## 2020-10-06 PROCEDURE — 76705 ECHO EXAM OF ABDOMEN: CPT

## 2020-10-06 PROCEDURE — 86038 ANTINUCLEAR ANTIBODIES: CPT

## 2020-10-06 PROCEDURE — 83690 ASSAY OF LIPASE: CPT

## 2020-10-06 PROCEDURE — 80048 BASIC METABOLIC PNL TOTAL CA: CPT

## 2020-10-06 PROCEDURE — 36415 COLL VENOUS BLD VENIPUNCTURE: CPT

## 2020-10-06 PROCEDURE — 1200000000 HC SEMI PRIVATE

## 2020-10-06 RX ORDER — AMOXICILLIN AND CLAVULANATE POTASSIUM 875; 125 MG/1; MG/1
1 TABLET, FILM COATED ORAL EVERY 12 HOURS SCHEDULED
Status: DISCONTINUED | OUTPATIENT
Start: 2020-10-06 | End: 2020-10-07 | Stop reason: HOSPADM

## 2020-10-06 RX ADMIN — SODIUM CHLORIDE 1.5 G: 900 INJECTION INTRAVENOUS at 06:12

## 2020-10-06 RX ADMIN — SODIUM CHLORIDE, PRESERVATIVE FREE 10 ML: 5 INJECTION INTRAVENOUS at 21:51

## 2020-10-06 RX ADMIN — Medication 1 TABLET: at 00:15

## 2020-10-06 RX ADMIN — SODIUM CHLORIDE 1.5 G: 900 INJECTION INTRAVENOUS at 00:15

## 2020-10-06 RX ADMIN — Medication 1 TABLET: at 21:50

## 2020-10-06 RX ADMIN — AMOXICILLIN AND CLAVULANATE POTASSIUM 1 TABLET: 875; 125 TABLET, FILM COATED ORAL at 21:50

## 2020-10-06 NOTE — CONSULTS
Department of General Surgery Consult    PATIENT NAME: Haven Holter   YOB: 2000    ADMISSION DATE: 10/5/2020  9:24 AM      TODAY'S DATE: 10/6/2020    Reason for Consult:  Gallstone pancreatitis    Chief Complaint: abd pain    Requesting Physician:  Sotero Mckeon    HISTORY OF PRESENT ILLNESS:              The patient is a 20 y.o. female who presents with abd pain. Reports intermittent RUQ pain with extension to her back over the past year. Started during her pregancy. Some nausea and emesis. No fevers or chills. Pain improved today. Past Medical History:        Diagnosis Date    Asthma     Stress reaction, metatarsal        Past Surgical History:        Procedure Laterality Date    TYMPANOSTOMY TUBE PLACEMENT  2000       Current Medications:   Current Facility-Administered Medications: amoxicillin-clavulanate (AUGMENTIN) 875-125 MG per tablet 1 tablet, 1 tablet, Oral, 2 times per day  prenatal vitamin 27-1 MG tablet 1 tablet, 1 tablet, Oral, Daily  sodium chloride flush 0.9 % injection 10 mL, 10 mL, Intravenous, 2 times per day  sodium chloride flush 0.9 % injection 10 mL, 10 mL, Intravenous, PRN  polyethylene glycol (GLYCOLAX) packet 17 g, 17 g, Oral, Daily PRN  enoxaparin (LOVENOX) injection 40 mg, 40 mg, Subcutaneous, Daily  ondansetron (ZOFRAN) injection 4 mg, 4 mg, Intravenous, Q6H PRN  ibuprofen (ADVIL;MOTRIN) tablet 400 mg, 400 mg, Oral, Q6H PRN  melatonin tablet 5 mg, 5 mg, Oral, Nightly PRN  Prior to Admission medications    Medication Sig Start Date End Date Taking?  Authorizing Provider   Prenatal Vit-Fe Fumarate-FA (PRENATAL 1+1 PO) Take by mouth   Yes Historical Provider, MD        Allergies:  Sulfa antibiotics    Social History:   TOBACCO:  no  ETOH:  no    Family History:        Problem Relation Age of Onset    High Blood Pressure Mother     Cancer Maternal Grandmother 30        Ovarian cancer       REVIEW OF SYSTEMS:  CONSTITUTIONAL:  negative  HEENT: negative  RESPIRATORY:  negative  CARDIOVASCULAR:  negative  GASTROINTESTINAL:  negative except for nausea, vomiting and abdominal pain  GENITOURINARY:  negative  HEMATOLOGIC/LYMPHATIC:  negative  NEUROLOGICAL:  Negative  * All other ROS reviewed and negative. PHYSICAL EXAM:  VITALS:  /77   Pulse 70   Temp 98.9 °F (37.2 °C) (Oral)   Resp 18   Ht 5' 10\" (1.778 m)   Wt 200 lb (90.7 kg)   SpO2 99%   Breastfeeding Yes   BMI 28.70 kg/m²   24HR INTAKE/OUTPUT:    I/O last 3 completed shifts: In: 2438.9 [P.O.:960; I.V.:1478.9]  Out: 700 [Urine:700]  I/O this shift:  In: -   Out: 300 [Urine:300]    CONSTITUTIONAL:  alert, no apparent distress and normal weight  EYES:  PERRL  ENT:  Normocephalic,atraumatic, without obvious abnormality  NECK:  supple, symmetrical, trachea midline  LUNGS: Resp effort easy and unlabored, no crackles or wheezing  CARDIOVASCULAR:  NO JVD, regular rate and rhythm and no murmur noted  ABDOMEN:  , normal bowel sounds, soft, non-distended, non-tender, voluntary guarding absent, no masses palpated   MUSCULOSKELETAL: No clubbing or cyanosis, 0+ pitting edema lower extremities  NEUROLOGIC:  Mental Status Exam:  Level of Alertness:   awake  PSYCHIATRIC:   person, place, time  SKIN:  no rashes    DATA:    CBC:   Recent Labs     10/05/20  0939 10/06/20  0556   WBC 7.7 5.5   HGB 13.0 11.7*   HCT 37.8 34.7*    288     BMP:    Recent Labs     10/05/20  0939 10/06/20  0556    141   K 4.1 4.0    108   CO2 24 22   BUN 10 8   CREATININE 0.7 0.7   GLUCOSE 112* 89     Hepatic:   Recent Labs     10/05/20  0939 10/06/20  0556   * 709*   * 837*   BILITOT 1.5* 2.2*   ALKPHOS 119 119     Mag:    No results for input(s): MG in the last 72 hours. Phos:   No results for input(s): PHOS in the last 72 hours. INR: No results for input(s): INR in the last 72 hours. Radiology Review: Images personally reviewed by me. US -   1.  Cholelithiasis with sonographic evidence of cholecystitis. 2. Borderline common duct dilatation.  Laboratory correlation requested. MRCP and/or ERCP would be options to further evaluate in the appropriate    clinical setting.             MRCP - 1. Cholelithiases without evidence of acute cholecystitis. IMPRESSION/RECOMMENDATIONS:    19 yo with gallstone pancreatitis  1. Symptoms improved and tolerating diet  2. Ok to discharge once LFTs improved and tolerating diet. If further workup negative for other cause will plan on lap iván next week.     Electronically signed by Estefani Hamilton, [address] [place] [address]  [id number]

## 2020-10-06 NOTE — CARE COORDINATION
Chart reviewed. Spoke with dr Elvis Guzmán. Stated patietn will likely have no DCP needs. Has insurance and PCP. Please notify should needs arise.

## 2020-10-06 NOTE — PLAN OF CARE
Problem: Coping:  Goal: Ability to verbalize feelings will improve  Description: Ability to verbalize feelings will improve  Outcome: Ongoing  Goal: Level of anxiety will decrease  Description: Level of anxiety will decrease  Outcome: Ongoing     Problem: Fluid Volume:  Goal: Will maintain adequate fluid volume  Description: Will maintain adequate fluid volume  Outcome: Ongoing     Problem: Health Behavior:  Goal: Ability to identify changes in lifestyle to reduce recurrence of condition will improve  Description: Ability to identify changes in lifestyle to reduce recurrence of condition will improve  Outcome: Ongoing     Problem: Nutritional:  Goal: Ability to achieve adequate nutritional intake will improve  Description: Ability to achieve adequate nutritional intake will improve  Outcome: Ongoing     Problem: Physical Regulation:  Goal: Complications related to the disease process, condition or treatment will be avoided or minimized  Description: Complications related to the disease process, condition or treatment will be avoided or minimized  Outcome: Ongoing  Goal: Hemodynamic stability will improve  Description: Hemodynamic stability will improve  Outcome: Ongoing     Problem: Respiratory:  Goal: Ability to achieve and maintain a regular respiratory rate will improve  Description: Ability to achieve and maintain a regular respiratory rate will improve  Outcome: Ongoing     Problem: Sensory:  Goal: General experience of comfort will improve  Description: General experience of comfort will improve  Outcome: Ongoing

## 2020-10-06 NOTE — PROGRESS NOTES
LABALBU 4.7 3.7   * 709*   * 837*   ALKPHOS 119 119   BILITOT 1.5* 2.2*   BILIDIR 1.0* 1.1*   LIPASE 570.0* 55.0     EXAMINATION:    MRI OF THE ABDOMEN WITHOUT CONTRAST AND MRCP 10/6/2020 6:52 am         TECHNIQUE:    Multiplanar multisequence MRI of the abdomen was performed without the    administration of intravenous contrast.  After initial T2 axial and coronal    images, thick slab, thin slab and 3D coronal MRCP sequences were obtained    without the administration of intravenous contrast.  MIP images are provided    for review.         COMPARISON:    10/05/2020         HISTORY:    ORDERING SYSTEM PROVIDED HISTORY: elevated liver enzymes, gallstones, r/o    retained common bile duct stone    TECHNOLOGIST PROVIDED HISTORY:    Reason for exam:->elevated liver enzymes, gallstones, r/o retained common    bile duct stone    Is the patient pregnant?->No         FINDINGS:    Gallbladder: Cholelithiases are present in lumen of the gallbladder.         Bile Ducts: There is no intrahepatic or extrahepatic ductal dilatation.  The    common bile duct has a maximal dimension of 5 mm.  There is no filling defect    to suggest choledocholithiasis.         Pancreatic Duct: The pancreatic duct is normal in course and caliber.         Other:  There is diffuse dropout of signal on the in and out of phase    sequences throughout the liver consistent with hepatic steatosis.           Impression    1. Cholelithiases without evidence of acute cholecystitis. Assessment and Plan:    Gallstone pancreatitis - no significant pain at this time, no emesis, lipase normal now. Supportive care with IV fluids, can advance diet as tolerated, low fat diet  MRCP was NEGATIVE for any filling defects or retained stones in CBD.   LFTs are still significantly elevated, would monitor and make sure these are trending down  May have passed a small stone causing the LFT elevations but need to watch and make sure these improve. Acute hepatitis panel (hep A, B, C) negative  Timing of cholecystectomy - defer to Surgery but likely after improvement in LFTs. Following with you    All findings and recommendations were discussed in detail with patient and her mother today at bedside.

## 2020-10-06 NOTE — PROGRESS NOTES
Pt is a/o x4. VSS. Assessment as charted. - Pt complains of mild RUQ abd pain- no pain meds needed at this time- active bowel sounds- soft abd.   - Pt will remain NPO for MRCP tomorrow- questionnaire completed and sent down. Pt is currently resting in her bed that is locked and in its lowest position w/ her call light within reach, non-skid socks on, and bed alarm off d/t pt being independent. Pt denies any other needs at this time. Will continue to monitor.

## 2020-10-06 NOTE — PROGRESS NOTES
Hospitalist Progress Note      PCP: Driss Stockton DO    Date of Admission: 10/5/2020    Chief Complaint:  RUQ pain, n/v     History Of Present Illness: The patient is a pleasant 20 Y F with minimal PMH other than giving birth to a healthy baby boy about a month ago. She is breastfeeding. During her pregnancy she was treated conservatively for biliary colic, with sludge on her GB ultrasound. For the last week she has had intermittent spells of severe RUQ pain, radiating to her R scapular area, associated with nausea and vomiting. Another such spell occurred around 2:30 this AM and lasted until 7 AM, so she came to the ED to be evaluated. US showed calculous cholecystitis with a dilated CBD, and labs showed hepatitis and pancreatitis. She was not febrile and did not have a leukocytosis. GI, general surgery, and hospital medicine were consulted from the ED. Subjective:  She continues to feel well. Hasn't had a pain attack since early yesterday morning. No fevers or chills either. Hungry. Medications:  Reviewed    Infusion Medications    sodium chloride 125 mL/hr at 10/05/20 1800     Scheduled Medications    prenatal vitamin  1 tablet Oral Daily    sodium chloride flush  10 mL Intravenous 2 times per day    enoxaparin  40 mg Subcutaneous Daily    ampicillin-sulbactam  1.5 g Intravenous Q6H     PRN Meds: sodium chloride flush, polyethylene glycol, ondansetron, ibuprofen, melatonin      Intake/Output Summary (Last 24 hours) at 10/6/2020 1129  Last data filed at 10/6/2020 0612  Gross per 24 hour   Intake 2438.87 ml   Output 700 ml   Net 1738.87 ml       Physical Exam Performed:    /77   Pulse 70   Temp 98.9 °F (37.2 °C) (Oral)   Resp 18   Ht 5' 10\" (1.778 m)   Wt 200 lb (90.7 kg)   SpO2 99%   Breastfeeding Yes   BMI 28.70 kg/m²       General appearance:  No apparent distress, appears stated age and cooperative. HEENT:  Normal cephalic, atraumatic without obvious deformity. Cholelithiasis with sonographic evidence of cholecystitis. 2. Borderline common duct dilatation. Laboratory correlation requested. MRCP and/or ERCP would be options to further evaluate in the appropriate   clinical setting. US LIVER    (Results Pending)           Assessment/Plan:    Active Hospital Problems    Diagnosis    Gallstone pancreatitis [K85.10]       The patient is a pleasant 20 Y F with minimal PMH other than giving birth to a healthy baby boy about a month ago. She is breastfeeding. During her pregnancy she was treated conservatively for biliary colic, with sludge on her GB ultrasound. For the last week she has had intermittent spells of severe RUQ pain, radiating to her R scapular area, associated with nausea and vomiting. Another such spell occurred around 2:30 this AM and lasted until 7 AM, so she came to the ED to be evaluated. US showed calculous cholecystitis with a dilated CBD, and labs showed hepatitis and pancreatitis. She was not febrile and did not have a leukocytosis. GI, general surgery, and hospital medicine were consulted from the ED.       Cholecystitis ruled out. Probably biliary colic and quickly resolving gallstone pancreatitis. - overall there doesn't seem to be a biliary infection present. She did have stones, and the GB wall was mildly thickened at 6 mm, but she didn't have any RUQ tenderness on admission. No leukocytosis or fever. MRCP without evidence of cholecystitis. No stones in CBD. - analgesia. Avoiding acetaminophen due to the LFTs. Ibuprofen would be OK. Avoiding opioids if possible since she is breastfeeding - discussed risks in detail with patient and her mother, and they understand that if the patient is really miserable then the risk may be worth it, they agreed. - this patient would be low risk for eventual cholecystectomy, there are no contraindications, and no further preoperative testing would be necessary.      LFT elevation  - LFTs were mildly elevated on 8/25, and she has fatty liver on MRCP. - perhaps worse now due to recently passed gallstone. LFT not clearly elevated in a cholestatic pattern, however, and worsened a bit on day 2.    - recent viral hepatitis panel was negative. F/u repeat per GI.  - f/u ANTON, ASMA, AMA  - f/u hepatic vein doppler US  - she was believably only taking about 6 acetaminophen per day  - recent HIV negative. - recent chlamydia negative. - this would be too late for post-partum HELLP    GBS acute cystitis  - because of concern for cholecystitis, she was given zosyn in the ED, then continued with unasyn (avoided both cipro and metronidazole since she is breastfeeding). Now just treating for UTI and will complete a course of augmentin.      Asthma  - she apparently isn't on any medications. Monitor for wheezing. She is allowed to have her baby with her at all times here in the hospital.  She was pumping here but the baby wasn't feeding well with a bottle at home. She is also allowed to have one adult visitor with her at all times to help care for the baby. Unfortunately this cannot be the same person all day long, but the family will try to limit the number of people who are coming and going. DVT Prophylaxis: enoxaparin  Diet: Diet NPO, After Midnight Exceptions are: Ice Chips, Sips with Meds  Code Status: Full Code    PT/OT Eval Status: not indicated    Dispo - when her liver enzymes are stable for outpatient management. Will look to GI for timing. Perhaps 10/7.       Annamarie Mcleod MD

## 2020-10-07 ENCOUNTER — TELEPHONE (OUTPATIENT)
Dept: SURGERY | Age: 20
End: 2020-10-07

## 2020-10-07 VITALS
BODY MASS INDEX: 28.63 KG/M2 | SYSTOLIC BLOOD PRESSURE: 115 MMHG | WEIGHT: 200 LBS | RESPIRATION RATE: 16 BRPM | TEMPERATURE: 97.8 F | DIASTOLIC BLOOD PRESSURE: 75 MMHG | OXYGEN SATURATION: 98 % | HEART RATE: 54 BPM | HEIGHT: 70 IN

## 2020-10-07 LAB
ALBUMIN SERPL-MCNC: 4.1 G/DL (ref 3.4–5)
ALP BLD-CCNC: 119 U/L (ref 40–129)
ALT SERPL-CCNC: 568 U/L (ref 10–40)
ANION GAP SERPL CALCULATED.3IONS-SCNC: 11 MMOL/L (ref 3–16)
ANTI-NUCLEAR ANTIBODY (ANA): NEGATIVE
AST SERPL-CCNC: 251 U/L (ref 15–37)
BASOPHILS ABSOLUTE: 0.1 K/UL (ref 0–0.2)
BASOPHILS RELATIVE PERCENT: 1.1 %
BILIRUB SERPL-MCNC: 0.8 MG/DL (ref 0–1)
BILIRUBIN DIRECT: <0.2 MG/DL (ref 0–0.3)
BILIRUBIN, INDIRECT: ABNORMAL MG/DL (ref 0–1)
BUN BLDV-MCNC: 9 MG/DL (ref 7–20)
CALCIUM SERPL-MCNC: 9.3 MG/DL (ref 8.3–10.6)
CHLORIDE BLD-SCNC: 103 MMOL/L (ref 99–110)
CO2: 24 MMOL/L (ref 21–32)
CREAT SERPL-MCNC: 0.7 MG/DL (ref 0.6–1.1)
EOSINOPHILS ABSOLUTE: 0.5 K/UL (ref 0–0.6)
EOSINOPHILS RELATIVE PERCENT: 7.2 %
GFR AFRICAN AMERICAN: >60
GFR NON-AFRICAN AMERICAN: >60
GLUCOSE BLD-MCNC: 91 MG/DL (ref 70–99)
HCT VFR BLD CALC: 36.2 % (ref 36–48)
HEMOGLOBIN: 12.2 G/DL (ref 12–16)
LYMPHOCYTES ABSOLUTE: 2.7 K/UL (ref 1–5.1)
LYMPHOCYTES RELATIVE PERCENT: 41.5 %
MCH RBC QN AUTO: 30.1 PG (ref 26–34)
MCHC RBC AUTO-ENTMCNC: 33.6 G/DL (ref 31–36)
MCV RBC AUTO: 89.6 FL (ref 80–100)
MONOCYTES ABSOLUTE: 0.7 K/UL (ref 0–1.3)
MONOCYTES RELATIVE PERCENT: 10.9 %
NEUTROPHILS ABSOLUTE: 2.6 K/UL (ref 1.7–7.7)
NEUTROPHILS RELATIVE PERCENT: 39.3 %
PDW BLD-RTO: 13.7 % (ref 12.4–15.4)
PLATELET # BLD: 291 K/UL (ref 135–450)
PMV BLD AUTO: 7.4 FL (ref 5–10.5)
POTASSIUM REFLEX MAGNESIUM: 3.7 MMOL/L (ref 3.5–5.1)
RBC # BLD: 4.04 M/UL (ref 4–5.2)
SODIUM BLD-SCNC: 138 MMOL/L (ref 136–145)
TOTAL PROTEIN: 7 G/DL (ref 6.4–8.2)
WBC # BLD: 6.5 K/UL (ref 4–11)

## 2020-10-07 PROCEDURE — 2580000003 HC RX 258: Performed by: INTERNAL MEDICINE

## 2020-10-07 PROCEDURE — 99232 SBSQ HOSP IP/OBS MODERATE 35: CPT | Performed by: INTERNAL MEDICINE

## 2020-10-07 PROCEDURE — 99232 SBSQ HOSP IP/OBS MODERATE 35: CPT | Performed by: SURGERY

## 2020-10-07 PROCEDURE — 80076 HEPATIC FUNCTION PANEL: CPT

## 2020-10-07 PROCEDURE — G0378 HOSPITAL OBSERVATION PER HR: HCPCS

## 2020-10-07 PROCEDURE — 80048 BASIC METABOLIC PNL TOTAL CA: CPT

## 2020-10-07 PROCEDURE — 6370000000 HC RX 637 (ALT 250 FOR IP): Performed by: INTERNAL MEDICINE

## 2020-10-07 PROCEDURE — 85025 COMPLETE CBC W/AUTO DIFF WBC: CPT

## 2020-10-07 RX ORDER — AMOXICILLIN AND CLAVULANATE POTASSIUM 875; 125 MG/1; MG/1
1 TABLET, FILM COATED ORAL EVERY 12 HOURS SCHEDULED
Qty: 6 TABLET | Refills: 0 | Status: SHIPPED | OUTPATIENT
Start: 2020-10-07 | End: 2020-10-10

## 2020-10-07 RX ADMIN — SODIUM CHLORIDE, PRESERVATIVE FREE 10 ML: 5 INJECTION INTRAVENOUS at 10:08

## 2020-10-07 RX ADMIN — AMOXICILLIN AND CLAVULANATE POTASSIUM 1 TABLET: 875; 125 TABLET, FILM COATED ORAL at 09:55

## 2020-10-07 NOTE — PROGRESS NOTES
Pt is a/o x4. VSS. Assessment as charted.      - Pt denies any RUQ abd pain- active bowel sounds- soft abd- tolerating diet well. - Pts anxiety has decreased tremendously in presence of having son at bedside- pt educated on  safety and to only have infant sleep in bassinet- she verbalized her understanding.      Pt is currently resting in her bed that is locked and in its lowest position w/ her call light within reach, non-skid socks on, and bed alarm off d/t pt being independent. Pt denies any other needs at this time. Will continue to monitor.

## 2020-10-07 NOTE — DISCHARGE SUMMARY
Hospital Medicine Discharge Summary    Patient ID: Sheryle Roulette      Patient's PCP: Nick Grimm DO    Admit Date: 10/5/2020     Discharge Date:   10/07/20     Admitting Physician: Angelic Turner MD     Discharge Physician: Sandra Mcdaniel MD     Discharge Diagnoses: Active Hospital Problems    Diagnosis    Gallstone pancreatitis [K85.10]       The patient was seen and examined on day of discharge and this discharge summary is in conjunction with any daily progress note from day of discharge. Hospital Course: The patient is a pleasant 20 Y F with minimal PMH other than giving birth to a healthy baby boy about a month ago. Vinicio Wan is breastfeeding. Camillia Somerdale her pregnancy she was treated conservatively for biliary colic, with sludge on her GB ultrasound.  For the last week she has had intermittent spells of severe RUQ pain, radiating to her R scapular area, associated with nausea and vomiting.  Another such spell occurred around 2:30 this AM and lasted until 7 AM, so she came to the ED to be evaluated.  US showed calculous cholecystitis with a dilated CBD, and labs showed hepatitis and pancreatitis.  She was not febrile and did not have a leukocytosis.  GI, general surgery, and hospital medicine were consulted from the ED.       Cholecystitis ruled out. Probably biliary colic and quickly resolving gallstone pancreatitis. - overall there doesn't seem to be a biliary infection present. She did have stones, and the GB wall was mildly thickened at 6 mm, but she didn't have any RUQ tenderness on admission. No leukocytosis or fever. MRCP without evidence of cholecystitis.   No stones in CBD.    - analgesia.  Avoiding acetaminophen due to the LFTs.  Ibuprofen would be OK.  Avoiding opioids if possible since she is breastfeeding - discussed risks in detail with patient and her mother, and they understand that if the patient is really miserable postoperatively then the risk may be worth it, they agreed. - this patient would be low risk for eventual cholecystectomy, there are no contraindications, and no further preoperative testing would be necessary.      LFT elevation  - LFTs were mildly elevated on 8/25, and she has fatty liver on MRCP. - perhaps worse now due to recently passed gallstone. LFT not clearly elevated in a cholestatic pattern, however. - recent viral hepatitis panel was negative, negative again here. - f/u ANTON, ASMA, AMA  - negative hepatic vein doppler US  - she was believably only taking about 6 acetaminophen per day  - recent HIV negative. - recent chlamydia negative. - no signs of post-partum HELLP, too late for this anyway     GBS acute cystitis  - because of concern for cholecystitis, she was given zosyn in the ED, then continued with unasyn (avoided both cipro and metronidazole since she is breastfeeding). Now just treating for UTI and will complete a course of augmentin.      Asthma  - she apparently isn't on any medications.  Monitor for wheezing.     She is allowed to have her baby with her at all times here in the hospital.  She was pumping here but the baby wasn't feeding well with a bottle at home. She is also allowed to have one adult visitor with her at all times to help care for the baby. Unfortunately this cannot be the same person all day long, but the family will try to limit the number of people who are coming and going. Physical Exam Performed:     BP (!) 104/59   Pulse 64   Temp 98.4 °F (36.9 °C) (Oral)   Resp 16   Ht 5' 10\" (1.778 m)   Wt 200 lb (90.7 kg)   SpO2 97%   Breastfeeding Yes   BMI 28.70 kg/m²       General appearance:  No apparent distress, appears stated age and cooperative. HEENT:  Normal cephalic, atraumatic without obvious deformity. Pupils equal, round, and reactive to light.  Extra ocular muscles intact. Conjunctivae/corneas clear. Neck: Supple, with full range of motion. No jugular venous distention.  Trachea midline. Respiratory:  Normal respiratory effort. Clear to auscultation, bilaterally without Rales/Wheezes/Rhonchi. Cardiovascular:  Regular rate and rhythm with normal S1/S2 without murmurs, rubs or gallops. Abdomen: Soft, non-tender, non-distended with normal bowel sounds.  No R flank tenderness either. Musculoskeletal:  No clubbing, cyanosis or edema bilaterally.  Full range of motion without deformity. Skin: Skin color, texture, turgor normal.  No rashes or lesions. Neurologic:  Neurovascularly intact without any focal sensory/motor deficits. Cranial nerves: II-XII intact, grossly non-focal.  Psychiatric:  Alert and oriented, thought content appropriate, normal insight.  Not particularly anxious.   Capillary Refill: Brisk,< 3 seconds   Peripheral Pulses: +2 palpable, equal bilaterally       Labs: For convenience and continuity at follow-up the following most recent labs are provided:      CBC:    Lab Results   Component Value Date    WBC 6.5 10/07/2020    HGB 12.2 10/07/2020    HCT 36.2 10/07/2020     10/07/2020       Renal:    Lab Results   Component Value Date     10/07/2020    K 3.7 10/07/2020     10/07/2020    CO2 24 10/07/2020    BUN 9 10/07/2020    CREATININE 0.7 10/07/2020    CALCIUM 9.3 10/07/2020         Significant Diagnostic Studies    Radiology:   US LIVER   Preliminary Result   Unremarkable ultrasonographic appearance of the liver with findings   suggestive of patent hepatic veins as described above. US DUP ABD PEL RETRO SCROT COMPLETE   Preliminary Result   Unremarkable ultrasonographic appearance of the liver with findings   suggestive of patent hepatic veins as described above. MRI ABDOMEN WO CONTRAST MRCP   Final Result   1. Cholelithiases without evidence of acute cholecystitis. US GALLBLADDER RUQ   Final Result   1. Cholelithiasis with sonographic evidence of cholecystitis. 2. Borderline common duct dilatation.   Laboratory correlation requested. MRCP and/or ERCP would be options to further evaluate in the appropriate   clinical setting. Consults:     IP CONSULT TO GENERAL SURGERY  IP CONSULT TO GI  IP CONSULT TO HOSPITALIST    Disposition:  home     Condition at Discharge: Stable    Discharge Instructions/Follow-up:  Follow up with PCP within 1-2 weeks and get your liver bloodwork checked. Follow up with general surgery and get scheduled to have your gallbladder removed. Code Status:  Full Code     Activity: activity as tolerated    Diet: low fat, low cholesterol diet      Discharge Medications:     Current Discharge Medication List           Details   amoxicillin-clavulanate (AUGMENTIN) 875-125 MG per tablet Take 1 tablet by mouth every 12 hours for 3 days  Qty: 6 tablet, Refills: 0              Details   Prenatal Vit-Fe Fumarate-FA (PRENATAL 1+1 PO) Take by mouth               Time Spent on discharge is more than 30 minutes in the examination, evaluation, counseling and review of medications and discharge plan. Signed:    Bruce Kidd MD   10/7/2020      Thank you Bebeto Sinha DO for the opportunity to be involved in this patient's care. If you have any questions or concerns please feel free to contact me at [phone].

## 2020-10-07 NOTE — PROGRESS NOTES
[facility]    PATIENT NAME: Clover Middleton     TODAY'S DATE: 10/7/2020    CHIEF COMPLAINT: none    INTERVAL HISTORY/HPI:    Pt without pain or nausea, no fevers or chills. REVIEW OF SYSTEMS:  Pertinent positives and negatives as per interval history section    OBJECTIVE:  VITALS:  BP (!) 104/59   Pulse 64   Temp 98.4 °F (36.9 °C) (Oral)   Resp 16   Ht 5' 10\" (1.778 m)   Wt 200 lb (90.7 kg)   SpO2 97%   Breastfeeding Yes   BMI 28.70 kg/m²     INTAKE/OUTPUT:    I/O last 3 completed shifts: In: 2243 [P.O.:1920; I.V.:470]  Out: 1750 [Urine:1750]  No intake/output data recorded. CONSTITUTIONAL:  awake and alert  LUNGS:  Respirations easy and unlabored, no crackles or wheezing  CARD:  regular rate and rhythm  ABDOMEN:  normal bowel sounds, soft, non-distended, non-tender     Data:  CBC:   Recent Labs     10/05/20  0939 10/06/20  0556 10/07/20  0538   WBC 7.7 5.5 6.5   HGB 13.0 11.7* 12.2   HCT 37.8 34.7* 36.2    288 291     BMP:    Recent Labs     10/05/20  0939 10/06/20  0556 10/07/20  0538    141 138   K 4.1 4.0 3.7    108 103   CO2 24 22 24   BUN 10 8 9   CREATININE 0.7 0.7 0.7   GLUCOSE 112* 89 91     Hepatic:   Recent Labs     10/05/20  0939 10/06/20  0556 10/07/20  0538   * 709* 251*   * 837* 568*   BILITOT 1.5* 2.2* 0.8   ALKPHOS 119 119 119     Mag:    No results for input(s): MG in the last 72 hours. Phos:   No results for input(s): PHOS in the last 72 hours. INR: No results for input(s): INR in the last 72 hours. Radiology Review:  *Imaging personally reviewed by me. NA      ASSESSMENT AND PLAN:  19 yo with gallstone pancreatitis  1. Symptoms improved and tolerating diet  2. LFTs improved  3.   Ok to discharge     Electronically signed by Heather Lock MD     [id number]

## 2020-10-07 NOTE — PROGRESS NOTES
PROT 8.2 6.5 7.0   LABALBU 4.7 3.7 4.1   * 709* 251*   * 837* 568*   ALKPHOS 119 119 119   BILITOT 1.5* 2.2* 0.8   BILIDIR 1.0* 1.1* <0.2   LIPASE 570.0* 55.0  --        Assessment and Plan:    Gallstone pancreatitis - no abdominal pain or emesis, improving. MRCP was NEGATIVE for CBD stone. Likely passed a CBD stone causing the significant LFT elevation. LFTs are improving,she is eager to go home today due to her baby (2 month old), if still improving and tolerating po can likely be discharged today with outpatient followup with Surgery for cholecystectomy. Would recommend repeating LFTs as outpatient in a few days prior to cholecystectomy to document normalization. Acute hepatitis panel was negative  Available as needed, please call if questions concerns.

## 2020-10-07 NOTE — LETTER
Surgery Scheduling Form:  DEMOGRAPHICS:                                                                                                         .  Patient Name:  Nevin Santos  Patient :  2000   Patient SS#:      Patient Phone:  [phone] (home)                         Alt. Patient Phone:                 Patient Address:  [id number]  [place] [place] [address]    PCP:  Latisha Tavarez DO  Insurance:  Payor: [address] [place] [address]  [address] / Plan: [address] [place] [address]  [address] / Product Type: *No Product type* /        Insurance ID Number:    Payor/Plan Subscr  Sex Relation Sub. Ins. ID Effective Group Num   1. [employer]U* NICOLE MAYO * 2000 Female Self [id number] 20                                    [address]   2.  [place] -* NICOLE MAYO * 2000 Female Self [id number] 20                                    [address]     Interpretor Needed:  (NO)  (TYPE)           LATEX ALLERGY: NO)  Allergies: Sulfa  Defibulator or Pacemaker:  (NO)    DIAGNOSIS & PROCEDURE:                                                                                       .  Diagnosis Code/Description:   Cholelithiases K80.20  Operation Code/Description:  Laparoscopic cholecystectomy with intraoperative cholangiogram 46161  Location:  [address] [place] [address]              Surgeon:  Dr. Markos Haque    SCHEDULING INFORMATION:                                                                                    .  Surgeon's Scheduling Instruction:  elective  Requested Date: 10/13/20     OR Time: 915 am            Patient Arrival Time: 715 am  OR Time Required:  90  Minutes  Anesthesia:  General       Equipment:  n/a                                                            SA Required (only for Mac and Gen): yes  Status:  Outpatient        Standard C-Arm (only for port and iván):  yes   Mini C-Arm: No PAT Required: Yes                                          Best Time to Call: Anytime  Pt.  Requested to see PCP for Pre-op H & P:  Yes  Cardiac Clearance Requested:  (NO)               PRE-CERTIFICATION INFORMATION:                                                                           .  Procedure/CPT code: Laparoscopic cholecystectomy with intraoperative cholangiogram 55689        Modifier:

## 2020-10-07 NOTE — PROGRESS NOTES
Pt alert and oriented, VSS. Shift assessment completed. Denies any abd pain. Will continue to monitor.

## 2020-10-08 ENCOUNTER — OFFICE VISIT (OUTPATIENT)
Dept: PRIMARY CARE CLINIC | Age: 20
End: 2020-10-08
Payer: COMMERCIAL

## 2020-10-08 ENCOUNTER — ANESTHESIA EVENT (OUTPATIENT)
Dept: OPERATING ROOM | Age: 20
End: 2020-10-08
Payer: COMMERCIAL

## 2020-10-08 ENCOUNTER — TELEPHONE (OUTPATIENT)
Dept: FAMILY MEDICINE CLINIC | Age: 20
End: 2020-10-08

## 2020-10-08 PROCEDURE — G8428 CUR MEDS NOT DOCUMENT: HCPCS | Performed by: NURSE PRACTITIONER

## 2020-10-08 PROCEDURE — G8417 CALC BMI ABV UP PARAM F/U: HCPCS | Performed by: NURSE PRACTITIONER

## 2020-10-08 PROCEDURE — 99211 OFF/OP EST MAY X REQ PHY/QHP: CPT | Performed by: NURSE PRACTITIONER

## 2020-10-08 NOTE — PROGRESS NOTES
Obstructive Sleep Apnea (MARK) Screening     Patient:  Sowmya Rebollar    YOB: 2000      Medical Record #:  [id number]                     Date:  10/8/2020     1. Are you a loud and/or regular snorer? []  Yes       [x] No    2. Have you been observed to gasp or stop breathing during sleep? []  Yes       [x] No    3. Do you feel tired or groggy upon awakening or do you awaken with a headache?           []  Yes       [] No    4. Are you often tired or fatigued during the wake time hours? []  Yes       [] No    5. Do you fall asleep sitting, reading, watching TV or driving? []  Yes       [] No    6. Do you often have problems with memory or concentration? []  Yes       [] No    **If patient's score is ? 3 they are considered high risk for MARK. An Anesthesia provider will evaluate the patient and develop a plan of care the day of surgery. Note:  If the patient's BMI is more than 35 kg m¯² , has neck circumference > 40 cm, and/or high blood pressure the risk is greater (© American Sleep Apnea Association, 2006).

## 2020-10-08 NOTE — TELEPHONE ENCOUNTER
[facility] Transitions Initial Follow Up Call    Call within 2 business days of discharge: Yes     Patient: Milagro Marti Patient : 2000 MRN: <[id number]>      RARS: Readmission Risk Score: 5       Spoke with: No answer on both numbers provided. Discharge department/facility: A    Non-face-to-face services provided:       Follow Up  Future Appointments   Date Time Provider Mavis Mills   10/8/2020 12:20 PM SCHEDULE, [facility] AND FLU CLINIC AND FLU MMA       Virginia Ramsey LPN

## 2020-10-09 ENCOUNTER — TELEPHONE (OUTPATIENT)
Dept: FAMILY MEDICINE CLINIC | Age: 20
End: 2020-10-09

## 2020-10-09 ENCOUNTER — TELEPHONE (OUTPATIENT)
Dept: INTERNAL MEDICINE CLINIC | Age: 20
End: 2020-10-09

## 2020-10-09 LAB
F-ACTIN AB IGG: 4 UNITS (ref 0–19)
MITOCHONDRIAL M2 AB, IGG: 25.5 UNITS (ref 0–24.9)
SARS-COV-2, NAA: NOT DETECTED

## 2020-10-09 NOTE — TELEPHONE ENCOUNTER
I have been unable to reach the patient by phone. Her voicemail inbox is full. Her anti-mitochondrial Abs were slightly positive. The significance of this test is uncertain at this point. Her PCP should repeat this test to verify. If positive again, she should be referred to GI, particularly if her liver enzymes do not normalize. The patient had never heard of Dr. Greta Collado, and said she was working on getting a PCP, so I did not route this message to Dr. Adrienne Mcmullen. I will try to reach her again.

## 2020-10-09 NOTE — TELEPHONE ENCOUNTER
[facility] Transitions Initial Follow Up Call    Outreach made within 2 business days of discharge: Yes    Patient: Bella Borden Patient : 2000   MRN: <[id number]>  Reason for Admission: There are no discharge diagnoses documented for the most recent discharge.   Discharge Date: 10/7/20           Discharge department/facility: Patient had surgery, no follow up needed with PCP       Jose Martin

## 2020-10-13 ENCOUNTER — ANESTHESIA (OUTPATIENT)
Dept: OPERATING ROOM | Age: 20
End: 2020-10-13
Payer: COMMERCIAL

## 2020-10-13 ENCOUNTER — HOSPITAL ENCOUNTER (OUTPATIENT)
Dept: GENERAL RADIOLOGY | Age: 20
Discharge: HOME | End: 2020-10-13
Attending: SURGERY
Payer: COMMERCIAL

## 2020-10-13 ENCOUNTER — HOSPITAL ENCOUNTER (OUTPATIENT)
Age: 20
Setting detail: OUTPATIENT SURGERY
Discharge: HOME | End: 2020-10-13
Attending: SURGERY | Admitting: SURGERY
Payer: COMMERCIAL

## 2020-10-13 VITALS
TEMPERATURE: 97 F | DIASTOLIC BLOOD PRESSURE: 74 MMHG | SYSTOLIC BLOOD PRESSURE: 121 MMHG | RESPIRATION RATE: 15 BRPM | OXYGEN SATURATION: 100 %

## 2020-10-13 VITALS
BODY MASS INDEX: 31.07 KG/M2 | HEART RATE: 76 BPM | TEMPERATURE: 97.9 F | RESPIRATION RATE: 16 BRPM | DIASTOLIC BLOOD PRESSURE: 70 MMHG | SYSTOLIC BLOOD PRESSURE: 132 MMHG | HEIGHT: 70 IN | WEIGHT: 217 LBS | OXYGEN SATURATION: 99 %

## 2020-10-13 LAB
A/G RATIO: 1.7 (ref 1.1–2.2)
ALBUMIN SERPL-MCNC: 4.5 G/DL (ref 3.4–5)
ALP BLD-CCNC: 75 U/L (ref 40–129)
ALT SERPL-CCNC: 82 U/L (ref 10–40)
ANION GAP SERPL CALCULATED.3IONS-SCNC: 12 MMOL/L (ref 3–16)
AST SERPL-CCNC: 16 U/L (ref 15–37)
BILIRUB SERPL-MCNC: 0.5 MG/DL (ref 0–1)
BUN BLDV-MCNC: 9 MG/DL (ref 7–20)
CALCIUM SERPL-MCNC: 9.3 MG/DL (ref 8.3–10.6)
CHLORIDE BLD-SCNC: 103 MMOL/L (ref 99–110)
CO2: 24 MMOL/L (ref 21–32)
CREAT SERPL-MCNC: 0.8 MG/DL (ref 0.6–1.1)
GFR AFRICAN AMERICAN: >60
GFR NON-AFRICAN AMERICAN: >60
GLOBULIN: 2.7 G/DL
GLUCOSE BLD-MCNC: 90 MG/DL (ref 70–99)
POTASSIUM REFLEX MAGNESIUM: 3.9 MMOL/L (ref 3.5–5.1)
PREGNANCY, URINE: NEGATIVE
SODIUM BLD-SCNC: 139 MMOL/L (ref 136–145)
TOTAL PROTEIN: 7.2 G/DL (ref 6.4–8.2)

## 2020-10-13 PROCEDURE — 2709999900 HC NON-CHARGEABLE SUPPLY: Performed by: SURGERY

## 2020-10-13 PROCEDURE — 7100000011 HC PHASE II RECOVERY - ADDTL 15 MIN: Performed by: SURGERY

## 2020-10-13 PROCEDURE — 84703 CHORIONIC GONADOTROPIN ASSAY: CPT

## 2020-10-13 PROCEDURE — 7100000001 HC PACU RECOVERY - ADDTL 15 MIN: Performed by: SURGERY

## 2020-10-13 PROCEDURE — 88304 TISSUE EXAM BY PATHOLOGIST: CPT

## 2020-10-13 PROCEDURE — 80053 COMPREHEN METABOLIC PANEL: CPT

## 2020-10-13 PROCEDURE — 3600000015 HC SURGERY LEVEL 5 ADDTL 15MIN: Performed by: SURGERY

## 2020-10-13 PROCEDURE — 2580000003 HC RX 258: Performed by: ANESTHESIOLOGY

## 2020-10-13 PROCEDURE — 3700000000 HC ANESTHESIA ATTENDED CARE: Performed by: SURGERY

## 2020-10-13 PROCEDURE — 2500000003 HC RX 250 WO HCPCS: Performed by: NURSE ANESTHETIST, CERTIFIED REGISTERED

## 2020-10-13 PROCEDURE — 2580000003 HC RX 258: Performed by: SURGERY

## 2020-10-13 PROCEDURE — 47563 LAPARO CHOLECYSTECTOMY/GRAPH: CPT | Performed by: SURGERY

## 2020-10-13 PROCEDURE — 2500000003 HC RX 250 WO HCPCS: Performed by: SURGERY

## 2020-10-13 PROCEDURE — 6360000002 HC RX W HCPCS: Performed by: NURSE ANESTHETIST, CERTIFIED REGISTERED

## 2020-10-13 PROCEDURE — 3600000005 HC SURGERY LEVEL 5 BASE: Performed by: SURGERY

## 2020-10-13 PROCEDURE — 3700000001 HC ADD 15 MINUTES (ANESTHESIA): Performed by: SURGERY

## 2020-10-13 PROCEDURE — 3209999900 FLUORO FOR SURGICAL PROCEDURES

## 2020-10-13 PROCEDURE — 6360000004 HC RX CONTRAST MEDICATION: Performed by: SURGERY

## 2020-10-13 PROCEDURE — 74300 X-RAY BILE DUCTS/PANCREAS: CPT

## 2020-10-13 PROCEDURE — 7100000010 HC PHASE II RECOVERY - FIRST 15 MIN: Performed by: SURGERY

## 2020-10-13 PROCEDURE — 7100000000 HC PACU RECOVERY - FIRST 15 MIN: Performed by: SURGERY

## 2020-10-13 RX ORDER — MIDAZOLAM HYDROCHLORIDE 1 MG/ML
INJECTION INTRAMUSCULAR; INTRAVENOUS PRN
Status: DISCONTINUED | OUTPATIENT
Start: 2020-10-13 | End: 2020-10-13 | Stop reason: SDUPTHER

## 2020-10-13 RX ORDER — BUPIVACAINE HYDROCHLORIDE AND EPINEPHRINE 5; 5 MG/ML; UG/ML
INJECTION, SOLUTION PERINEURAL PRN
Status: DISCONTINUED | OUTPATIENT
Start: 2020-10-13 | End: 2020-10-13 | Stop reason: ALTCHOICE

## 2020-10-13 RX ORDER — ROCURONIUM BROMIDE 10 MG/ML
INJECTION, SOLUTION INTRAVENOUS PRN
Status: DISCONTINUED | OUTPATIENT
Start: 2020-10-13 | End: 2020-10-13 | Stop reason: SDUPTHER

## 2020-10-13 RX ORDER — HYDRALAZINE HYDROCHLORIDE 20 MG/ML
5 INJECTION INTRAMUSCULAR; INTRAVENOUS EVERY 10 MIN PRN
Status: DISCONTINUED | OUTPATIENT
Start: 2020-10-13 | End: 2020-10-13 | Stop reason: HOSPADM

## 2020-10-13 RX ORDER — SODIUM CHLORIDE 0.9 % (FLUSH) 0.9 %
10 SYRINGE (ML) INJECTION EVERY 12 HOURS SCHEDULED
Status: DISCONTINUED | OUTPATIENT
Start: 2020-10-13 | End: 2020-10-13 | Stop reason: HOSPADM

## 2020-10-13 RX ORDER — SODIUM CHLORIDE 0.9 % (FLUSH) 0.9 %
10 SYRINGE (ML) INJECTION PRN
Status: DISCONTINUED | OUTPATIENT
Start: 2020-10-13 | End: 2020-10-13 | Stop reason: HOSPADM

## 2020-10-13 RX ORDER — MEPERIDINE HYDROCHLORIDE 50 MG/ML
12.5 INJECTION INTRAMUSCULAR; INTRAVENOUS; SUBCUTANEOUS EVERY 5 MIN PRN
Status: DISCONTINUED | OUTPATIENT
Start: 2020-10-13 | End: 2020-10-13 | Stop reason: HOSPADM

## 2020-10-13 RX ORDER — ONDANSETRON 2 MG/ML
INJECTION INTRAMUSCULAR; INTRAVENOUS PRN
Status: DISCONTINUED | OUTPATIENT
Start: 2020-10-13 | End: 2020-10-13 | Stop reason: SDUPTHER

## 2020-10-13 RX ORDER — MORPHINE SULFATE 2 MG/ML
1 INJECTION, SOLUTION INTRAMUSCULAR; INTRAVENOUS EVERY 5 MIN PRN
Status: DISCONTINUED | OUTPATIENT
Start: 2020-10-13 | End: 2020-10-13 | Stop reason: HOSPADM

## 2020-10-13 RX ORDER — ONDANSETRON 2 MG/ML
4 INJECTION INTRAMUSCULAR; INTRAVENOUS
Status: DISCONTINUED | OUTPATIENT
Start: 2020-10-13 | End: 2020-10-13 | Stop reason: HOSPADM

## 2020-10-13 RX ORDER — OXYCODONE HYDROCHLORIDE 5 MG/1
5-10 TABLET ORAL EVERY 6 HOURS PRN
Qty: 16 TABLET | Refills: 0 | Status: SHIPPED | OUTPATIENT
Start: 2020-10-13 | End: 2020-10-17

## 2020-10-13 RX ORDER — LIDOCAINE HYDROCHLORIDE 20 MG/ML
INJECTION, SOLUTION INFILTRATION; PERINEURAL PRN
Status: DISCONTINUED | OUTPATIENT
Start: 2020-10-13 | End: 2020-10-13 | Stop reason: SDUPTHER

## 2020-10-13 RX ORDER — KETOROLAC TROMETHAMINE 30 MG/ML
INJECTION, SOLUTION INTRAMUSCULAR; INTRAVENOUS PRN
Status: DISCONTINUED | OUTPATIENT
Start: 2020-10-13 | End: 2020-10-13 | Stop reason: SDUPTHER

## 2020-10-13 RX ORDER — SODIUM CHLORIDE, SODIUM LACTATE, POTASSIUM CHLORIDE, CALCIUM CHLORIDE 600; 310; 30; 20 MG/100ML; MG/100ML; MG/100ML; MG/100ML
INJECTION, SOLUTION INTRAVENOUS CONTINUOUS
Status: DISCONTINUED | OUTPATIENT
Start: 2020-10-13 | End: 2020-10-13 | Stop reason: HOSPADM

## 2020-10-13 RX ORDER — MORPHINE SULFATE 2 MG/ML
2 INJECTION, SOLUTION INTRAMUSCULAR; INTRAVENOUS EVERY 5 MIN PRN
Status: DISCONTINUED | OUTPATIENT
Start: 2020-10-13 | End: 2020-10-13 | Stop reason: HOSPADM

## 2020-10-13 RX ORDER — DIPHENHYDRAMINE HYDROCHLORIDE 50 MG/ML
12.5 INJECTION INTRAMUSCULAR; INTRAVENOUS
Status: DISCONTINUED | OUTPATIENT
Start: 2020-10-13 | End: 2020-10-13 | Stop reason: HOSPADM

## 2020-10-13 RX ORDER — OXYCODONE HYDROCHLORIDE AND ACETAMINOPHEN 5; 325 MG/1; MG/1
1 TABLET ORAL PRN
Status: DISCONTINUED | OUTPATIENT
Start: 2020-10-13 | End: 2020-10-13 | Stop reason: HOSPADM

## 2020-10-13 RX ORDER — DEXAMETHASONE SODIUM PHOSPHATE 4 MG/ML
INJECTION, SOLUTION INTRA-ARTICULAR; INTRALESIONAL; INTRAMUSCULAR; INTRAVENOUS; SOFT TISSUE PRN
Status: DISCONTINUED | OUTPATIENT
Start: 2020-10-13 | End: 2020-10-13 | Stop reason: SDUPTHER

## 2020-10-13 RX ORDER — HYDROMORPHONE HCL 110MG/55ML
PATIENT CONTROLLED ANALGESIA SYRINGE INTRAVENOUS PRN
Status: DISCONTINUED | OUTPATIENT
Start: 2020-10-13 | End: 2020-10-13 | Stop reason: SDUPTHER

## 2020-10-13 RX ORDER — FENTANYL CITRATE 50 UG/ML
INJECTION, SOLUTION INTRAMUSCULAR; INTRAVENOUS PRN
Status: DISCONTINUED | OUTPATIENT
Start: 2020-10-13 | End: 2020-10-13 | Stop reason: SDUPTHER

## 2020-10-13 RX ORDER — PROMETHAZINE HYDROCHLORIDE 25 MG/ML
6.25 INJECTION, SOLUTION INTRAMUSCULAR; INTRAVENOUS
Status: DISCONTINUED | OUTPATIENT
Start: 2020-10-13 | End: 2020-10-13 | Stop reason: HOSPADM

## 2020-10-13 RX ORDER — OXYCODONE HYDROCHLORIDE AND ACETAMINOPHEN 5; 325 MG/1; MG/1
2 TABLET ORAL PRN
Status: DISCONTINUED | OUTPATIENT
Start: 2020-10-13 | End: 2020-10-13 | Stop reason: HOSPADM

## 2020-10-13 RX ORDER — LABETALOL HYDROCHLORIDE 5 MG/ML
5 INJECTION, SOLUTION INTRAVENOUS EVERY 10 MIN PRN
Status: DISCONTINUED | OUTPATIENT
Start: 2020-10-13 | End: 2020-10-13 | Stop reason: HOSPADM

## 2020-10-13 RX ORDER — SODIUM CHLORIDE, SODIUM LACTATE, POTASSIUM CHLORIDE, AND CALCIUM CHLORIDE .6; .31; .03; .02 G/100ML; G/100ML; G/100ML; G/100ML
IRRIGANT IRRIGATION PRN
Status: DISCONTINUED | OUTPATIENT
Start: 2020-10-13 | End: 2020-10-13 | Stop reason: ALTCHOICE

## 2020-10-13 RX ORDER — PROPOFOL 10 MG/ML
INJECTION, EMULSION INTRAVENOUS PRN
Status: DISCONTINUED | OUTPATIENT
Start: 2020-10-13 | End: 2020-10-13 | Stop reason: SDUPTHER

## 2020-10-13 RX ADMIN — PROPOFOL 200 MG: 10 INJECTION, EMULSION INTRAVENOUS at 08:59

## 2020-10-13 RX ADMIN — LIDOCAINE HYDROCHLORIDE 60 MG: 20 INJECTION, SOLUTION INFILTRATION; PERINEURAL at 08:59

## 2020-10-13 RX ADMIN — ROCURONIUM BROMIDE 50 MG: 10 SOLUTION INTRAVENOUS at 09:01

## 2020-10-13 RX ADMIN — SODIUM CHLORIDE, SODIUM LACTATE, POTASSIUM CHLORIDE, AND CALCIUM CHLORIDE: .6; .31; .03; .02 INJECTION, SOLUTION INTRAVENOUS at 08:51

## 2020-10-13 RX ADMIN — DEXAMETHASONE SODIUM PHOSPHATE 10 MG: 4 INJECTION, SOLUTION INTRAMUSCULAR; INTRAVENOUS at 09:17

## 2020-10-13 RX ADMIN — FENTANYL CITRATE 50 MCG: 50 INJECTION INTRAMUSCULAR; INTRAVENOUS at 08:58

## 2020-10-13 RX ADMIN — ONDANSETRON 4 MG: 2 INJECTION INTRAMUSCULAR; INTRAVENOUS at 08:51

## 2020-10-13 RX ADMIN — KETOROLAC TROMETHAMINE 30 MG: 30 INJECTION, SOLUTION INTRAMUSCULAR at 09:36

## 2020-10-13 RX ADMIN — MIDAZOLAM HYDROCHLORIDE 2 MG: 2 INJECTION, SOLUTION INTRAMUSCULAR; INTRAVENOUS at 08:51

## 2020-10-13 RX ADMIN — HYDROMORPHONE HYDROCHLORIDE 1 MG: 2 INJECTION INTRAMUSCULAR; INTRAVENOUS; SUBCUTANEOUS at 09:59

## 2020-10-13 RX ADMIN — FENTANYL CITRATE 50 MCG: 50 INJECTION INTRAMUSCULAR; INTRAVENOUS at 08:55

## 2020-10-13 RX ADMIN — HYDROMORPHONE HYDROCHLORIDE 0.6 MG: 2 INJECTION INTRAMUSCULAR; INTRAVENOUS; SUBCUTANEOUS at 09:55

## 2020-10-13 RX ADMIN — HYDROMORPHONE HYDROCHLORIDE 0.4 MG: 2 INJECTION INTRAMUSCULAR; INTRAVENOUS; SUBCUTANEOUS at 09:37

## 2020-10-13 RX ADMIN — SUGAMMADEX 20 MG: 100 INJECTION, SOLUTION INTRAVENOUS at 09:45

## 2020-10-13 ASSESSMENT — PULMONARY FUNCTION TESTS
PIF_VALUE: 14
PIF_VALUE: 23
PIF_VALUE: 1
PIF_VALUE: 1
PIF_VALUE: 2
PIF_VALUE: 17
PIF_VALUE: 18
PIF_VALUE: 14
PIF_VALUE: 23
PIF_VALUE: 16
PIF_VALUE: 19
PIF_VALUE: 23
PIF_VALUE: 23
PIF_VALUE: 0
PIF_VALUE: 22
PIF_VALUE: 15
PIF_VALUE: 0
PIF_VALUE: 23
PIF_VALUE: 2
PIF_VALUE: 18
PIF_VALUE: 15
PIF_VALUE: 16
PIF_VALUE: 14
PIF_VALUE: 23
PIF_VALUE: 13
PIF_VALUE: 23
PIF_VALUE: 23
PIF_VALUE: 19
PIF_VALUE: 3
PIF_VALUE: 15
PIF_VALUE: 22
PIF_VALUE: 24
PIF_VALUE: 22
PIF_VALUE: 19
PIF_VALUE: 16
PIF_VALUE: 19
PIF_VALUE: 23
PIF_VALUE: 5
PIF_VALUE: 23
PIF_VALUE: 19
PIF_VALUE: 19
PIF_VALUE: 23
PIF_VALUE: 17
PIF_VALUE: 17
PIF_VALUE: 23
PIF_VALUE: 23
PIF_VALUE: 0
PIF_VALUE: 23
PIF_VALUE: 19
PIF_VALUE: 30
PIF_VALUE: 1
PIF_VALUE: 14
PIF_VALUE: 29
PIF_VALUE: 2
PIF_VALUE: 16

## 2020-10-13 ASSESSMENT — PAIN - FUNCTIONAL ASSESSMENT: PAIN_FUNCTIONAL_ASSESSMENT: 0-10

## 2020-10-13 ASSESSMENT — ENCOUNTER SYMPTOMS: SHORTNESS OF BREATH: 1

## 2020-10-13 NOTE — ANESTHESIA POSTPROCEDURE EVALUATION
Department of Anesthesiology  Postprocedure Note    Patient: Melodie Wilson  MRN: [id number]  YOB: 2000  Date of evaluation: 10/13/2020    Procedure Summary     Date:  10/13/20 Room / Location:  [address] [place] [address]    Anesthesia Start:  1352 Anesthesia Stop:  1000    Procedure:  LAPAROSCOPIC CHOLECYSTECTOMY WITH INTRAOPERATIVE CHOLANGIOGRAM (N/A Abdomen) Diagnosis:       Calculus of gallbladder without cholecystitis without obstruction      (CHOLELITHIASIS)    Surgeon:  Page Monroy MD Responsible Provider:  Laura Mccarthy MD    Anesthesia Type:  general ASA Status:  2        Anesthesia Type: general    Veronica Phase I: Veronica Score: 10    Veronica Phase II:      Last vitals: Reviewed and per EMR flowsheets.      Anesthesia Post Evaluation   Anesthetic Problems: no   Cardiovascular System Stable: yes  Respiratory Function: Airway Patent yes  ETT no  Ventilator no  Level of consciousness: awake, alert and oriented  Post-op pain: adequate analgesia  Hydration Adequate: yes  Nausea/Vomiting:no  Other Issues:     Linwood Bridges MD

## 2020-10-13 NOTE — ANESTHESIA PRE PROCEDURE
Department of Anesthesiology  Preprocedure Note       Name:  Bella Borden   Age:  20 y.o.  :  2000                                          MRN:  [id number]         Date:  10/13/2020      Surgeon: Kiran Lock):  Dave Quiñones MD    Procedure: Procedure(s):  LAPAROSCOPIC CHOLECYSTECTOMY WITH INTRAOPERATIVE CHOLANGIOGRAM, POSSIBLE OPEN PROCEDURE    Medications prior to admission:   Prior to Admission medications    Medication Sig Start Date End Date Taking? Authorizing Provider   Prenatal Vit-Fe Fumarate-FA (PRENATAL 1+1 PO) Take by mouth    Historical Provider, MD       Current medications:    Current Facility-Administered Medications   Medication Dose Route Frequency Provider Last Rate Last Dose    lactated ringers infusion   Intravenous Continuous  Rise, MD        sodium chloride flush 0.9 % injection 10 mL  10 mL Intravenous 2 times per day  Rise, MD        sodium chloride flush 0.9 % injection 10 mL  10 mL Intravenous PRN  Rise, MD        famotidine (PEPCID) injection 20 mg  20 mg Intravenous Once  Rise, MD        ceFAZolin (ANCEF) 2 g in dextrose 5 % 100 mL IVPB  2 g Intravenous On Call to Tiffanie Weinstein MD           Allergies:     Allergies   Allergen Reactions    Sulfa Antibiotics Hives       Problem List:    Patient Active Problem List   Diagnosis Code    Acne L70.9    SOB (shortness of breath) on exertion R06.02    Headache R51.9    Mood changes R45.86    Stress reaction, metatarsal F43.0    Finger dislocation S63.259A    Wrist sprain S63.509A    Scapular dyskinesis G25.89    Closed nondisplaced fracture of distal phalanx of lesser toe of right foot S92.534A    Gallstone pancreatitis K85.10    Acute cholecystitis due to biliary calculus K80.00    Acute gallstone pancreatitis K85.10    Transaminitis R74.01    Elevated liver enzymes R74.8       Past Medical History: Diagnosis Date    Asthma     Elevated LFTs     Stress reaction, metatarsal        Past Surgical History:        Procedure Laterality Date    TYMPANOSTOMY TUBE PLACEMENT  2000       Social History:    Social History     Tobacco Use    Smoking status: Never Smoker    Smokeless tobacco: Never Used   Substance Use Topics    Alcohol use: No                                Counseling given: Not Answered      Vital Signs (Current):   Vitals:    10/08/20 0927 10/13/20 0734   BP:  132/70   Pulse:  76   Resp:  16   Temp:  97.9 °F (36.6 °C)   TempSrc:  Temporal   SpO2:  99%   Weight: 220 lb (99.8 kg) 217 lb (98.4 kg)   Height: 5' 10\" (1.778 m) 5' 10\" (1.778 m)                                              BP Readings from Last 3 Encounters:   10/13/20 132/70   10/07/20 115/75   08/15/18 116/79       NPO Status: Time of last liquid consumption: 2300                        Time of last solid consumption: 2300                        Date of last liquid consumption: 10/12/20                        Date of last solid food consumption: 10/12/20    BMI:   Wt Readings from Last 3 Encounters:   10/13/20 217 lb (98.4 kg)   10/05/20 200 lb (90.7 kg)   08/15/18 190 lb (86.2 kg) (96 %, Z= 1.81)*     * Growth percentiles are based on CDC (Girls, 2-20 Years) data. Body mass index is 31.14 kg/m².     CBC:   Lab Results   Component Value Date    WBC 6.5 10/07/2020    RBC 4.04 10/07/2020    HGB 12.2 10/07/2020    HCT 36.2 10/07/2020    MCV 89.6 10/07/2020    RDW 13.7 10/07/2020     10/07/2020       CMP:   Lab Results   Component Value Date     10/13/2020    K 3.9 10/13/2020     10/13/2020    CO2 24 10/13/2020    BUN 9 10/13/2020    CREATININE 0.8 10/13/2020    GFRAA >60 10/13/2020    AGRATIO 1.7 10/13/2020    LABGLOM >60 10/13/2020    GLUCOSE 90 10/13/2020    PROT 7.2 10/13/2020    CALCIUM 9.3 10/13/2020    BILITOT 0.5 10/13/2020    ALKPHOS 75 10/13/2020    AST 16 10/13/2020    ALT 82 10/13/2020       POC Tests: No results for input(s): POCGLU, POCNA, POCK, POCCL, POCBUN, POCHEMO, POCHCT in the last 72 hours. Coags: No results found for: PROTIME, INR, APTT    HCG (If Applicable):   Lab Results   Component Value Date    PREGTESTUR Negative 10/13/2020        ABGs: No results found for: PHART, PO2ART, STV4KNI, KIJ2FPU, BEART, C5XRJJYN     Type & Screen (If Applicable):  No results found for: LABABO, LABRH    Drug/Infectious Status (If Applicable):  No results found for: HIV, HEPCAB    COVID-19 Screening (If Applicable):   Lab Results   Component Value Date    COVID19 NOT DETECTED 10/08/2020         Anesthesia Evaluation   no history of anesthetic complications:   Airway: Mallampati: II  TM distance: >3 FB   Neck ROM: full  Mouth opening: > = 3 FB Dental: normal exam         Pulmonary:   (+) shortness of breath: chronic,  asthma:                            Cardiovascular:Negative CV ROS                      Neuro/Psych:   (+) headaches:, psychiatric history:depression/anxiety             GI/Hepatic/Renal:            ROS comment: cholecystitis. Endo/Other: Negative Endo/Other ROS                    Abdominal:           Vascular: negative vascular ROS. Anesthesia Plan      general     ASA 2     (Pt agrees to risks, benefits and alternatives of GETA. Questions answered. Willing to proceed with plan.)  Induction: intravenous. Anesthetic plan and risks discussed with patient.                       Flavia Pena MD   10/13/2020

## 2020-10-13 NOTE — H&P
I have reviewed the history and physical and examined the patient. I find no relevant changes. I have reviewed with the patient and/or family members, during the preoperative office visit the risks, benefits, and alternatives to the procedure.     Ramírez Collins

## 2020-10-13 NOTE — PROGRESS NOTES
Patent admitted to PACU from OR. Patient teary, C/O pain. Given 1 mg dilaudid per anesthesia. VS WNL.

## 2020-10-13 NOTE — OP NOTE
Date of Surgery: 10/13/20    Preop Dx:  Gallstone pancreatitis    Postop Dx:  Same    Procedure:  Laparoscopic Cholecystectomy with Intraoperative Cholangiogram    Surgeon:  Massiel Swift    Assistant:      Anesthesia:  GETA    EBL:   <50ml    Specimen:  gallbladder    Complications: none    Drains/Lines:  none    Indications:  19 yo with history of gallstone pancreatitis    Description:  Patient was given adequate description of the risks and rewards of the procedure, including bleeding, infection, injury to surrounding structures such as the common bile duct, need for further surgery, and possibility of open procedure and freely consented. He was given appropriate antibiotics and brought to the OR where general anesthesia was induced. He was placed in supine position. Prepped and draped in usual sterile fashion. Area above umbilicus injected with local anesthetic and #11 blade used to incise epidermis. This allowed passage of 5mm optiview trocar using zero degree laparoscope. Once this was inserted the abdomen was insufflated to 15 mmHg pressure with CO2. Patient then positioned in slight reverse Trendelenburg position. Thirty degree laparoscope inserted. Abdomen inspected and no acute inflammation seen. Area below xiphoid process and just to right of midline injected with local anesthetic and under direct visualization a 12mm trocar was inserted without issue. Then two 5mm trocars were inserted in the patient's right side using similar technique as the subxiphoid trocar. Gallbladder then grasped and retracted over dome of liver. Also grasped at infundibulum and retracted anterolaterally. Cystic duct then circumferentially dissected. One endoclip placed proximally on cystic duct, which was then partially divided. Cholangiogram catheter inserted into duct and cholangiogram performed. This demonstrated some filling of left and right hepatic ducts and flow into duodenum without signs of obstruction. It was then removed. Three endoclips were placed on the distal cystic duct and it was completely divided. The cystic artery was then circumferentially dissected with two clips being placed proximal and one distal.  It was then divided. Using electrocautery the gallbladder was dissected free of the liver bed, placed into an endoretrieval bag, removed from the abdomen, and sent off as specimen. The liver bed was then inspected and made hemostatic with electrocautery. The area was copiously irrigated with all fluid suctioned off. The cystic duct and artery had clips intact without signs of leak of bleeding. Under direct visualization the trocars were removed and without bleeding at their insertion sites. The abdomen was allowed to desufflate. The subxiphoid trocar site had its fascia closed with 0-0 vicryl figure of eight stitch. All of the trocar sites had the epidermis closed with 4-0 monocryl in the subcuticular plane. Sterile dressing placed. All suture, sponge and instrument count correct times two at end of case. Transferred to PACU in stable condition.     Emma Desouza MD

## 2020-10-13 NOTE — PROGRESS NOTES
Patient up to bathroom able to void. DC teaching completed with father. Script picked up by him. Patient IV removed and patient taken to car by wheelchair.

## 2020-10-13 NOTE — RESULT ENCOUNTER NOTE
Your Covid-10 teste resulted not detected/negative. What happens if I have a negative test?    Remember to wash your hands often, avoid touching your face, stay 6 feet from people you do not live with, and wear a cloth facemask when you go out in public. A negative COVID-19 test at one point in time does not mean you will stay negative. You could become ill with COVID-19 and/or test positive at any time. If you are a close contact of a confirmed or suspected case, continue to stay home and away from others until 14 days after your last exposure. If you do not have symptoms, and were not in close contact with a confirmed or suspected case, you can stop isolating. If you currently have symptoms of COVID-19, and were not in close contact with a confirmed or suspected case, you should keep monitoring symptoms and talk to your doctor or other healthcare provider about staying home and if you need to get tested again. If you develop symptoms of COVID-19, stay at home and away from others and talk to your doctor or other healthcare provider about getting tested again. For additional information, visit coronavirus. [place].gov. For answers to your COVID-19 questions, call [phone]-ASK-[facility] ([phone]).

## 2020-10-26 ENCOUNTER — OFFICE VISIT (OUTPATIENT)
Dept: SURGERY | Age: 20
End: 2020-10-26

## 2020-10-26 VITALS
BODY MASS INDEX: 31.07 KG/M2 | HEART RATE: 86 BPM | SYSTOLIC BLOOD PRESSURE: 98 MMHG | WEIGHT: 217 LBS | TEMPERATURE: 98.1 F | DIASTOLIC BLOOD PRESSURE: 65 MMHG | HEIGHT: 70 IN

## 2020-10-26 PROCEDURE — 99024 POSTOP FOLLOW-UP VISIT: CPT | Performed by: SURGERY

## 2020-10-29 NOTE — PROGRESS NOTES
HPI: Nursing notes reviewed. Patient is a 24 yo who underwent laparoscopic cholecystectomy at [facility].   Reports minimal pain and no nausea. Energy is near normal.  no diarrhea and no constipation. ROS:  10 point review of systems performed with pertinent positives in HPI    Phys:    Abd - soft. , minimal tender, nondistended   Incisions - no erythema    Assesment: 24 yo s/p laparoscopic cholecystectomy     Plan: 1. Doing well postop with minimal pain and no nausea   2. No activity limitations   3. Low fat diet   4.   Call with concerns

## 2022-12-19 NOTE — ED TRIAGE NOTES
MICHAEL Galicia at bedside assessing pt.
If you are a smoker, it is important for your health to stop smoking. Please be aware that second hand smoke is also harmful.

## (undated) DEVICE — Device

## (undated) DEVICE — LAPAROSCOPIC CHOLANGIOGRAM CATHETER: Brand: AMERICAN CATHETER CORP

## (undated) DEVICE — Z INACTIVE USE 2641839 CLIP INT M L POLYMER LOK LIG HEM O LOK

## (undated) DEVICE — PUMP SUC IRR TBNG L10FT W/ HNDPC ASSEMB STRYKEFLOW 2

## (undated) DEVICE — SOLUTION IV 1000ML LAC RINGERS PH 6.5 INJ USP VIAFLX PLAS

## (undated) DEVICE — [HIGH FLOW INSUFFLATOR,  DO NOT USE IF PACKAGE IS DAMAGED,  KEEP DRY,  KEEP AWAY FROM SUNLIGHT,  PROTECT FROM HEAT AND RADIOACTIVE SOURCES.]: Brand: PNEUMOSURE

## (undated) DEVICE — GOWN SIRUS NONREIN XL W/TWL: Brand: MEDLINE INDUSTRIES, INC.

## (undated) DEVICE — GAUZE,SPONGE,2"X2",8PLY,STERILE,LF,2'S: Brand: MEDLINE

## (undated) DEVICE — GLOVE,SURG,SENSICARE SLT,LF,PF,7: Brand: MEDLINE

## (undated) DEVICE — SUTURE VCRL + SZ 3-0 L18IN ABSRB UD SH 1/2 CIR TAPERCUT NDL VCP864D